# Patient Record
Sex: MALE | Race: WHITE | NOT HISPANIC OR LATINO
[De-identification: names, ages, dates, MRNs, and addresses within clinical notes are randomized per-mention and may not be internally consistent; named-entity substitution may affect disease eponyms.]

---

## 2015-02-25 RX ORDER — LISINOPRIL 2.5 MG/1
1 TABLET ORAL
Qty: 0 | Refills: 0 | COMMUNITY
Start: 2015-02-25

## 2017-02-15 ENCOUNTER — RX RENEWAL (OUTPATIENT)
Age: 57
End: 2017-02-15

## 2017-03-13 ENCOUNTER — MEDICATION RENEWAL (OUTPATIENT)
Age: 57
End: 2017-03-13

## 2017-03-13 ENCOUNTER — RX RENEWAL (OUTPATIENT)
Age: 57
End: 2017-03-13

## 2017-05-01 ENCOUNTER — APPOINTMENT (OUTPATIENT)
Dept: PULMONOLOGY | Facility: CLINIC | Age: 57
End: 2017-05-01

## 2017-05-01 VITALS
SYSTOLIC BLOOD PRESSURE: 120 MMHG | DIASTOLIC BLOOD PRESSURE: 80 MMHG | HEIGHT: 70 IN | WEIGHT: 215 LBS | HEART RATE: 65 BPM | OXYGEN SATURATION: 95 % | RESPIRATION RATE: 17 BRPM | BODY MASS INDEX: 30.78 KG/M2

## 2017-05-10 ENCOUNTER — RX RENEWAL (OUTPATIENT)
Age: 57
End: 2017-05-10

## 2017-05-12 ENCOUNTER — MEDICATION RENEWAL (OUTPATIENT)
Age: 57
End: 2017-05-12

## 2017-05-12 ENCOUNTER — RX RENEWAL (OUTPATIENT)
Age: 57
End: 2017-05-12

## 2017-06-24 ENCOUNTER — RX RENEWAL (OUTPATIENT)
Age: 57
End: 2017-06-24

## 2017-08-18 ENCOUNTER — RX RENEWAL (OUTPATIENT)
Age: 57
End: 2017-08-18

## 2017-10-02 ENCOUNTER — NON-APPOINTMENT (OUTPATIENT)
Age: 57
End: 2017-10-02

## 2017-10-02 ENCOUNTER — APPOINTMENT (OUTPATIENT)
Dept: CARDIOLOGY | Facility: CLINIC | Age: 57
End: 2017-10-02
Payer: COMMERCIAL

## 2017-10-02 VITALS
OXYGEN SATURATION: 90 % | DIASTOLIC BLOOD PRESSURE: 84 MMHG | BODY MASS INDEX: 30.78 KG/M2 | HEIGHT: 70 IN | HEART RATE: 71 BPM | SYSTOLIC BLOOD PRESSURE: 155 MMHG | WEIGHT: 215 LBS

## 2017-10-02 PROCEDURE — 93000 ELECTROCARDIOGRAM COMPLETE: CPT

## 2017-10-02 PROCEDURE — 99215 OFFICE O/P EST HI 40 MIN: CPT

## 2017-10-11 ENCOUNTER — OTHER (OUTPATIENT)
Age: 57
End: 2017-10-11

## 2017-10-11 DIAGNOSIS — R09.89 OTHER SPECIFIED SYMPTOMS AND SIGNS INVOLVING THE CIRCULATORY AND RESPIRATORY SYSTEMS: ICD-10-CM

## 2017-10-24 ENCOUNTER — APPOINTMENT (OUTPATIENT)
Dept: PULMONOLOGY | Facility: CLINIC | Age: 57
End: 2017-10-24

## 2017-11-06 ENCOUNTER — NON-APPOINTMENT (OUTPATIENT)
Age: 57
End: 2017-11-06

## 2017-11-06 ENCOUNTER — APPOINTMENT (OUTPATIENT)
Dept: CARDIOLOGY | Facility: CLINIC | Age: 57
End: 2017-11-06
Payer: COMMERCIAL

## 2017-11-06 VITALS
DIASTOLIC BLOOD PRESSURE: 83 MMHG | SYSTOLIC BLOOD PRESSURE: 160 MMHG | OXYGEN SATURATION: 96 % | HEIGHT: 70 IN | HEART RATE: 62 BPM | BODY MASS INDEX: 30.78 KG/M2 | WEIGHT: 215 LBS

## 2017-11-06 VITALS — DIASTOLIC BLOOD PRESSURE: 80 MMHG | SYSTOLIC BLOOD PRESSURE: 145 MMHG

## 2017-11-06 PROCEDURE — 99215 OFFICE O/P EST HI 40 MIN: CPT

## 2017-11-06 PROCEDURE — 93000 ELECTROCARDIOGRAM COMPLETE: CPT

## 2017-11-21 ENCOUNTER — RX RENEWAL (OUTPATIENT)
Age: 57
End: 2017-11-21

## 2017-11-21 RX ORDER — ARMODAFINIL 250 MG/1
250 TABLET ORAL
Qty: 30 | Refills: 2 | Status: ACTIVE | COMMUNITY
Start: 2017-11-21 | End: 1900-01-01

## 2017-11-22 ENCOUNTER — APPOINTMENT (OUTPATIENT)
Dept: CARDIOLOGY | Facility: CLINIC | Age: 57
End: 2017-11-22
Payer: COMMERCIAL

## 2017-11-22 PROCEDURE — 93015 CV STRESS TEST SUPVJ I&R: CPT

## 2017-11-22 PROCEDURE — 78452 HT MUSCLE IMAGE SPECT MULT: CPT

## 2017-11-22 PROCEDURE — A9500: CPT

## 2017-12-11 ENCOUNTER — APPOINTMENT (OUTPATIENT)
Dept: CARDIOLOGY | Facility: CLINIC | Age: 57
End: 2017-12-11

## 2018-01-25 ENCOUNTER — APPOINTMENT (OUTPATIENT)
Dept: PULMONOLOGY | Facility: CLINIC | Age: 58
End: 2018-01-25
Payer: COMMERCIAL

## 2018-01-25 VITALS
OXYGEN SATURATION: 96 % | HEART RATE: 76 BPM | SYSTOLIC BLOOD PRESSURE: 120 MMHG | BODY MASS INDEX: 30.21 KG/M2 | DIASTOLIC BLOOD PRESSURE: 70 MMHG | HEIGHT: 70 IN | WEIGHT: 211 LBS | RESPIRATION RATE: 14 BRPM

## 2018-01-25 PROCEDURE — 99214 OFFICE O/P EST MOD 30 MIN: CPT | Mod: 25

## 2018-01-25 PROCEDURE — 94010 BREATHING CAPACITY TEST: CPT

## 2018-01-25 PROCEDURE — 99406 BEHAV CHNG SMOKING 3-10 MIN: CPT

## 2018-01-25 RX ORDER — UMECLIDINIUM BROMIDE AND VILANTEROL TRIFENATATE 62.5; 25 UG/1; UG/1
62.5-25 POWDER RESPIRATORY (INHALATION) DAILY
Qty: 3 | Refills: 1 | Status: ACTIVE | COMMUNITY
Start: 2018-01-25 | End: 1900-01-01

## 2018-01-31 ENCOUNTER — RX RENEWAL (OUTPATIENT)
Age: 58
End: 2018-01-31

## 2018-02-16 ENCOUNTER — RX RENEWAL (OUTPATIENT)
Age: 58
End: 2018-02-16

## 2018-02-19 ENCOUNTER — RX RENEWAL (OUTPATIENT)
Age: 58
End: 2018-02-19

## 2018-02-26 ENCOUNTER — RX RENEWAL (OUTPATIENT)
Age: 58
End: 2018-02-26

## 2018-06-15 ENCOUNTER — MEDICATION RENEWAL (OUTPATIENT)
Age: 58
End: 2018-06-15

## 2018-07-06 ENCOUNTER — RX RENEWAL (OUTPATIENT)
Age: 58
End: 2018-07-06

## 2018-07-12 ENCOUNTER — FORM ENCOUNTER (OUTPATIENT)
Age: 58
End: 2018-07-12

## 2018-07-13 ENCOUNTER — OUTPATIENT (OUTPATIENT)
Dept: OUTPATIENT SERVICES | Facility: HOSPITAL | Age: 58
LOS: 1 days | End: 2018-07-13
Payer: COMMERCIAL

## 2018-07-13 ENCOUNTER — APPOINTMENT (OUTPATIENT)
Dept: CT IMAGING | Facility: CLINIC | Age: 58
End: 2018-07-13
Payer: COMMERCIAL

## 2018-07-13 DIAGNOSIS — Z00.8 ENCOUNTER FOR OTHER GENERAL EXAMINATION: ICD-10-CM

## 2018-07-13 DIAGNOSIS — Z95.5 PRESENCE OF CORONARY ANGIOPLASTY IMPLANT AND GRAFT: Chronic | ICD-10-CM

## 2018-07-13 PROCEDURE — 71250 CT THORAX DX C-: CPT

## 2018-07-13 PROCEDURE — 71250 CT THORAX DX C-: CPT | Mod: 26

## 2018-07-17 ENCOUNTER — APPOINTMENT (OUTPATIENT)
Dept: CARDIOLOGY | Facility: CLINIC | Age: 58
End: 2018-07-17
Payer: COMMERCIAL

## 2018-07-17 ENCOUNTER — NON-APPOINTMENT (OUTPATIENT)
Age: 58
End: 2018-07-17

## 2018-07-17 VITALS
HEART RATE: 67 BPM | SYSTOLIC BLOOD PRESSURE: 139 MMHG | HEIGHT: 70 IN | BODY MASS INDEX: 31.07 KG/M2 | WEIGHT: 217 LBS | DIASTOLIC BLOOD PRESSURE: 77 MMHG | OXYGEN SATURATION: 95 %

## 2018-07-17 PROCEDURE — 99215 OFFICE O/P EST HI 40 MIN: CPT

## 2018-07-17 PROCEDURE — 93000 ELECTROCARDIOGRAM COMPLETE: CPT

## 2018-07-20 ENCOUNTER — OTHER (OUTPATIENT)
Age: 58
End: 2018-07-20

## 2018-07-20 DIAGNOSIS — G45.9 TRANSIENT CEREBRAL ISCHEMIC ATTACK, UNSPECIFIED: ICD-10-CM

## 2018-07-21 ENCOUNTER — APPOINTMENT (OUTPATIENT)
Dept: CARDIOLOGY | Facility: CLINIC | Age: 58
End: 2018-07-21

## 2018-07-24 ENCOUNTER — MEDICATION RENEWAL (OUTPATIENT)
Age: 58
End: 2018-07-24

## 2018-07-25 ENCOUNTER — APPOINTMENT (OUTPATIENT)
Dept: PULMONOLOGY | Facility: CLINIC | Age: 58
End: 2018-07-25
Payer: COMMERCIAL

## 2018-07-25 ENCOUNTER — NON-APPOINTMENT (OUTPATIENT)
Age: 58
End: 2018-07-25

## 2018-07-25 VITALS
SYSTOLIC BLOOD PRESSURE: 130 MMHG | HEIGHT: 70 IN | RESPIRATION RATE: 16 BRPM | WEIGHT: 217 LBS | DIASTOLIC BLOOD PRESSURE: 70 MMHG | HEART RATE: 61 BPM | BODY MASS INDEX: 31.07 KG/M2 | OXYGEN SATURATION: 99 %

## 2018-07-25 PROCEDURE — 94618 PULMONARY STRESS TESTING: CPT

## 2018-07-25 PROCEDURE — 94010 BREATHING CAPACITY TEST: CPT | Mod: 59

## 2018-07-25 PROCEDURE — 99214 OFFICE O/P EST MOD 30 MIN: CPT | Mod: 25

## 2018-07-25 RX ORDER — ARMODAFINIL 250 MG/1
250 TABLET ORAL
Qty: 30 | Refills: 5 | Status: ACTIVE | COMMUNITY
Start: 2018-07-25 | End: 1900-01-01

## 2018-08-01 ENCOUNTER — RX RENEWAL (OUTPATIENT)
Age: 58
End: 2018-08-01

## 2018-08-06 ENCOUNTER — APPOINTMENT (OUTPATIENT)
Dept: CARDIOLOGY | Facility: CLINIC | Age: 58
End: 2018-08-06
Payer: COMMERCIAL

## 2018-08-06 ENCOUNTER — RX RENEWAL (OUTPATIENT)
Age: 58
End: 2018-08-06

## 2018-08-06 PROCEDURE — 93306 TTE W/DOPPLER COMPLETE: CPT

## 2018-11-12 ENCOUNTER — NON-APPOINTMENT (OUTPATIENT)
Age: 58
End: 2018-11-12

## 2018-11-12 ENCOUNTER — APPOINTMENT (OUTPATIENT)
Dept: CARDIOLOGY | Facility: CLINIC | Age: 58
End: 2018-11-12
Payer: COMMERCIAL

## 2018-11-12 VITALS
WEIGHT: 220 LBS | DIASTOLIC BLOOD PRESSURE: 87 MMHG | OXYGEN SATURATION: 95 % | HEIGHT: 70 IN | SYSTOLIC BLOOD PRESSURE: 144 MMHG | BODY MASS INDEX: 31.5 KG/M2 | HEART RATE: 60 BPM

## 2018-11-12 PROCEDURE — 93000 ELECTROCARDIOGRAM COMPLETE: CPT

## 2018-11-12 PROCEDURE — 99215 OFFICE O/P EST HI 40 MIN: CPT

## 2018-11-12 NOTE — HISTORY OF PRESENT ILLNESS
[FreeTextEntry1] : Anibal Vega presented to the office today for a cardiovascular evaluation. He was last seen in the office in July.\par \par He is now 58 years old, with a history of coronary artery disease. In March of 2008, he developed unstable angina. He was transferred to [facility], where he was found to have a 90% stenosis in his proximal RCA, which was treated with a bare-metal stent. Nonobstructive disease was identified within his LAD at that time.  He had another stress test performed February 23, 2015. After concluding the test, he developed ST segment elevations, and was transferred emergently to [facility]. He was found to have an occluded circumflex with insignificant in-stent restenosis within the RCA stent. Moderate disease was found more distally in the RCA, as well as in the LAD.  Primary angioplasty was performed.  A bare-metal stent was implanted because of a history of bright red blood per rectum.  He has a history of smoking. He has a history of obstructive sleep apnea.  He has been diagnosed with COPD, and he has been taking inhalers intermittently.\par \par At the time of the last visit, he was feeling overall well. \par \par He reports that over the last month, he has had symptoms of discomfort in his mid chest, or by activity and relieved by rest. It does not seem to radiate. He has had these symptoms provoked by relatively modest levels of activity, in warm weather. It does not occur only with extreme levels of activity. About 2 weeks ago, he had mild symptoms at rest, which went away fairly briskly.\par \par He continues to smoke in small amounts.

## 2018-11-12 NOTE — REASON FOR VISIT
[Follow-Up - Clinic] : a clinic follow-up of [Coronary Artery Disease] : coronary artery disease [Dyspnea] : dyspnea

## 2018-11-12 NOTE — PHYSICAL EXAM
[General Appearance - Well Developed] : well developed [Normal Appearance] : normal appearance [Well Groomed] : well groomed [General Appearance - Well Nourished] : well nourished [No Deformities] : no deformities [General Appearance - In No Acute Distress] : no acute distress [Normal Conjunctiva] : the conjunctiva exhibited no abnormalities [Eyelids - No Xanthelasma] : the eyelids demonstrated no xanthelasmas [Normal Oral Mucosa] : normal oral mucosa [No Oral Pallor] : no oral pallor [No Oral Cyanosis] : no oral cyanosis [Normal Jugular Venous A Waves Present] : normal jugular venous A waves present [Normal Jugular Venous V Waves Present] : normal jugular venous V waves present [No Jugular Venous Youngblood A Waves] : no jugular venous youngblood A waves [Respiration, Rhythm And Depth] : normal respiratory rhythm and effort [Exaggerated Use Of Accessory Muscles For Inspiration] : no accessory muscle use [Auscultation Breath Sounds / Voice Sounds] : lungs were clear to auscultation bilaterally [Abdomen Soft] : soft [Abdomen Tenderness] : non-tender [Abdomen Mass (___ Cm)] : no abdominal mass palpated [Abnormal Walk] : normal gait [Gait - Sufficient For Exercise Testing] : the gait was sufficient for exercise testing [Nail Clubbing] : no clubbing of the fingernails [Cyanosis, Localized] : no localized cyanosis [Petechial Hemorrhages (___cm)] : no petechial hemorrhages [Skin Color & Pigmentation] : normal skin color and pigmentation [] : no rash [No Venous Stasis] : no venous stasis [Skin Lesions] : no skin lesions [No Skin Ulcers] : no skin ulcer [No Xanthoma] : no  xanthoma was observed [Oriented To Time, Place, And Person] : oriented to person, place, and time [Affect] : the affect was normal [Mood] : the mood was normal [No Anxiety] : not feeling anxious [Normal Rate] : normal [Rhythm Regular] : regular [Normal S1] : normal S1 [Normal S2] : normal S2 [No Gallop] : no gallop heard [S3] : no S3 [S4] : no S4 [Right Carotid Bruit] : no bruit heard over the right carotid [Left Carotid Bruit] : left carotid bruit heard [Right Femoral Bruit] : no bruit heard over the right femoral artery [Left Femoral Bruit] : no bruit heard over the left femoral artery [2+] : left 2+ [Bruit] : no bruit heard [No Pitting Edema] : no pitting edema present

## 2018-11-12 NOTE — DISCUSSION/SUMMARY
[FreeTextEntry1] : Mr. Vega has a history of coronary artery disease, dating back to 2008. He has a history of smoking with associated COPD. He presented in 2015 with exertional dyspnea.  He developed a myocardial infarction following the conclusion of exercise on the treadmill.\par \par He presents with symptoms consistent with unstable angina, at a relatively low".\par \par We discussed his various options. Given his history and clinical course, I think that cardiac catheterization is the best solution. He will have this done within the next several days, and return to the office in 2 weeks for another evaluation.

## 2018-11-15 ENCOUNTER — INPATIENT (INPATIENT)
Facility: HOSPITAL | Age: 58
LOS: 0 days | Discharge: HOME | DRG: 247 | End: 2018-11-16
Attending: INTERNAL MEDICINE | Admitting: INTERNAL MEDICINE
Payer: COMMERCIAL

## 2018-11-15 ENCOUNTER — TRANSCRIPTION ENCOUNTER (OUTPATIENT)
Age: 58
End: 2018-11-15

## 2018-11-15 VITALS
HEIGHT: 70 IN | SYSTOLIC BLOOD PRESSURE: 150 MMHG | OXYGEN SATURATION: 97 % | RESPIRATION RATE: 16 BRPM | DIASTOLIC BLOOD PRESSURE: 72 MMHG | HEART RATE: 62 BPM | TEMPERATURE: 98 F | WEIGHT: 220.02 LBS

## 2018-11-15 DIAGNOSIS — Z95.5 PRESENCE OF CORONARY ANGIOPLASTY IMPLANT AND GRAFT: Chronic | ICD-10-CM

## 2018-11-15 DIAGNOSIS — I25.10 ATHEROSCLEROTIC HEART DISEASE OF NATIVE CORONARY ARTERY WITHOUT ANGINA PECTORIS: ICD-10-CM

## 2018-11-15 LAB
ALBUMIN SERPL ELPH-MCNC: 4.4 G/DL — SIGNIFICANT CHANGE UP (ref 3.3–5)
ALP SERPL-CCNC: 92 U/L — SIGNIFICANT CHANGE UP (ref 40–120)
ALT FLD-CCNC: 28 U/L — SIGNIFICANT CHANGE UP (ref 10–45)
ANION GAP SERPL CALC-SCNC: 12 MMOL/L — SIGNIFICANT CHANGE UP (ref 5–17)
AST SERPL-CCNC: 21 U/L — SIGNIFICANT CHANGE UP (ref 10–40)
BILIRUB SERPL-MCNC: 0.4 MG/DL — SIGNIFICANT CHANGE UP (ref 0.2–1.2)
BUN SERPL-MCNC: 15 MG/DL — SIGNIFICANT CHANGE UP (ref 7–23)
CALCIUM SERPL-MCNC: 9.8 MG/DL — SIGNIFICANT CHANGE UP (ref 8.4–10.5)
CHLORIDE SERPL-SCNC: 102 MMOL/L — SIGNIFICANT CHANGE UP (ref 96–108)
CO2 SERPL-SCNC: 26 MMOL/L — SIGNIFICANT CHANGE UP (ref 22–31)
CREAT SERPL-MCNC: 1.37 MG/DL — HIGH (ref 0.5–1.3)
GLUCOSE SERPL-MCNC: 82 MG/DL — SIGNIFICANT CHANGE UP (ref 70–99)
HCT VFR BLD CALC: 48.6 % — SIGNIFICANT CHANGE UP (ref 39–50)
HGB BLD-MCNC: 16.4 G/DL — SIGNIFICANT CHANGE UP (ref 13–17)
MCHC RBC-ENTMCNC: 31.3 PG — SIGNIFICANT CHANGE UP (ref 27–34)
MCHC RBC-ENTMCNC: 33.8 GM/DL — SIGNIFICANT CHANGE UP (ref 32–36)
MCV RBC AUTO: 92.4 FL — SIGNIFICANT CHANGE UP (ref 80–100)
PLATELET # BLD AUTO: 251 K/UL — SIGNIFICANT CHANGE UP (ref 150–400)
POTASSIUM SERPL-MCNC: 4.6 MMOL/L — SIGNIFICANT CHANGE UP (ref 3.5–5.3)
POTASSIUM SERPL-SCNC: 4.6 MMOL/L — SIGNIFICANT CHANGE UP (ref 3.5–5.3)
PROT SERPL-MCNC: 7.4 G/DL — SIGNIFICANT CHANGE UP (ref 6–8.3)
RBC # BLD: 5.25 M/UL — SIGNIFICANT CHANGE UP (ref 4.2–5.8)
RBC # FLD: 12.9 % — SIGNIFICANT CHANGE UP (ref 10.3–14.5)
SODIUM SERPL-SCNC: 140 MMOL/L — SIGNIFICANT CHANGE UP (ref 135–145)
WBC # BLD: 12.9 K/UL — HIGH (ref 3.8–10.5)
WBC # FLD AUTO: 12.9 K/UL — HIGH (ref 3.8–10.5)

## 2018-11-15 PROCEDURE — 99204 OFFICE O/P NEW MOD 45 MIN: CPT

## 2018-11-15 PROCEDURE — 99152 MOD SED SAME PHYS/QHP 5/>YRS: CPT | Mod: GC

## 2018-11-15 PROCEDURE — 93010 ELECTROCARDIOGRAM REPORT: CPT

## 2018-11-15 PROCEDURE — 93010 ELECTROCARDIOGRAM REPORT: CPT | Mod: 77,76

## 2018-11-15 PROCEDURE — 92928 PRQ TCAT PLMT NTRAC ST 1 LES: CPT | Mod: RC,GC

## 2018-11-15 PROCEDURE — 93458 L HRT ARTERY/VENTRICLE ANGIO: CPT | Mod: 26,59,GC

## 2018-11-15 RX ORDER — LISINOPRIL 2.5 MG/1
10 TABLET ORAL DAILY
Qty: 0 | Refills: 0 | Status: DISCONTINUED | OUTPATIENT
Start: 2018-11-15 | End: 2018-11-16

## 2018-11-15 RX ORDER — CHOLECALCIFEROL (VITAMIN D3) 125 MCG
1 CAPSULE ORAL
Qty: 0 | Refills: 0 | COMMUNITY

## 2018-11-15 RX ORDER — SODIUM CHLORIDE 9 MG/ML
250 INJECTION INTRAMUSCULAR; INTRAVENOUS; SUBCUTANEOUS ONCE
Qty: 0 | Refills: 0 | Status: DISCONTINUED | OUTPATIENT
Start: 2018-11-15 | End: 2018-11-16

## 2018-11-15 RX ORDER — CHOLECALCIFEROL (VITAMIN D3) 125 MCG
5000 CAPSULE ORAL DAILY
Qty: 0 | Refills: 0 | Status: DISCONTINUED | OUTPATIENT
Start: 2018-11-15 | End: 2018-11-16

## 2018-11-15 RX ORDER — BUPROPION HYDROCHLORIDE 150 MG/1
1 TABLET, EXTENDED RELEASE ORAL
Qty: 0 | Refills: 0 | COMMUNITY

## 2018-11-15 RX ORDER — METOPROLOL TARTRATE 50 MG
25 TABLET ORAL EVERY 12 HOURS
Qty: 0 | Refills: 0 | Status: DISCONTINUED | OUTPATIENT
Start: 2018-11-15 | End: 2018-11-16

## 2018-11-15 RX ORDER — MULTIVIT-MIN/FERROUS GLUCONATE 9 MG/15 ML
1 LIQUID (ML) ORAL
Qty: 0 | Refills: 0 | COMMUNITY

## 2018-11-15 RX ORDER — MONTELUKAST 4 MG/1
1 TABLET, CHEWABLE ORAL
Qty: 0 | Refills: 0 | COMMUNITY

## 2018-11-15 RX ORDER — IPRATROPIUM/ALBUTEROL SULFATE 18-103MCG
3 AEROSOL WITH ADAPTER (GRAM) INHALATION EVERY 6 HOURS
Qty: 0 | Refills: 0 | Status: DISCONTINUED | OUTPATIENT
Start: 2018-11-15 | End: 2018-11-15

## 2018-11-15 RX ORDER — BUPROPION HYDROCHLORIDE 150 MG/1
150 TABLET, EXTENDED RELEASE ORAL DAILY
Qty: 0 | Refills: 0 | Status: DISCONTINUED | OUTPATIENT
Start: 2018-11-15 | End: 2018-11-16

## 2018-11-15 RX ORDER — MONTELUKAST 4 MG/1
10 TABLET, CHEWABLE ORAL DAILY
Qty: 0 | Refills: 0 | Status: DISCONTINUED | OUTPATIENT
Start: 2018-11-15 | End: 2018-11-16

## 2018-11-15 RX ORDER — CLOPIDOGREL BISULFATE 75 MG/1
75 TABLET, FILM COATED ORAL DAILY
Qty: 0 | Refills: 0 | Status: DISCONTINUED | OUTPATIENT
Start: 2018-11-16 | End: 2018-11-16

## 2018-11-15 RX ORDER — ASPIRIN/CALCIUM CARB/MAGNESIUM 324 MG
81 TABLET ORAL DAILY
Qty: 0 | Refills: 0 | Status: DISCONTINUED | OUTPATIENT
Start: 2018-11-16 | End: 2018-11-16

## 2018-11-15 RX ORDER — ATORVASTATIN CALCIUM 80 MG/1
80 TABLET, FILM COATED ORAL AT BEDTIME
Qty: 0 | Refills: 0 | Status: DISCONTINUED | OUTPATIENT
Start: 2018-11-15 | End: 2018-11-16

## 2018-11-15 RX ORDER — SODIUM CHLORIDE 9 MG/ML
1000 INJECTION INTRAMUSCULAR; INTRAVENOUS; SUBCUTANEOUS
Qty: 0 | Refills: 0 | Status: DISCONTINUED | OUTPATIENT
Start: 2018-11-15 | End: 2018-11-16

## 2018-11-15 RX ORDER — ASPIRIN/CALCIUM CARB/MAGNESIUM 324 MG
1 TABLET ORAL
Qty: 0 | Refills: 0 | COMMUNITY

## 2018-11-15 RX ORDER — MULTIVIT-MIN/FERROUS GLUCONATE 9 MG/15 ML
1 LIQUID (ML) ORAL DAILY
Qty: 0 | Refills: 0 | Status: DISCONTINUED | OUTPATIENT
Start: 2018-11-15 | End: 2018-11-16

## 2018-11-15 RX ADMIN — BUPROPION HYDROCHLORIDE 150 MILLIGRAM(S): 150 TABLET, EXTENDED RELEASE ORAL at 17:34

## 2018-11-15 RX ADMIN — SODIUM CHLORIDE 275 MILLILITER(S): 9 INJECTION INTRAMUSCULAR; INTRAVENOUS; SUBCUTANEOUS at 17:32

## 2018-11-15 RX ADMIN — Medication 25 MILLIGRAM(S): at 17:34

## 2018-11-15 RX ADMIN — Medication 1 TABLET(S): at 17:32

## 2018-11-15 NOTE — DISCHARGE NOTE ADULT - CARE PROVIDER_API CALL
John Wallis), Cardiovascular Disease; Interventional Cardiology  [address] [place] [id number]  Phone: [phone]  Fax: [phone]

## 2018-11-15 NOTE — DISCHARGE NOTE ADULT - PLAN OF CARE
Low salt, low fat diet.   Weight management.   Take medications as prescribed.    No smoking.  Follow up appointments with your doctor(s)  as instruced. No heavy lifting for 2 weeks, no strenuous activity  ( pushing/ pulling) no driving for x 2 days,  you may shower 24 hours following procedure but no bathing or swimming for x1  week, no strenuous sex for x 1 week & follow up with your cardiologist in 1-2 week Your LDL cholesterol will be less than 70mg/dL Continue with your cholesterol medications. Eat a heart healthy diet that is low in saturated fats and salt, and includes whole grains, fruits, vegetables and lean protein; exercise regularly (consult with your physician or cardiologist first); maintain a heart healthy weight; if you smoke - quit (A resource to help you stop smoking is the [facility] – phone number [phone].). Continue to follow with your primary physician or cardiologist. Your blood pressure will be controlled. Continue with your blood pressure medications; eat a heart healthy diet with low salt diet; exercise regularly (consult with your physician or cardiologist first); maintain a heart healthy weight; if you smoke - quit (A resource to help you stop smoking is the [facility] – phone number [phone].); include healthy ways to manage stress. Continue to follow with your primary care physician or cardiologist.

## 2018-11-15 NOTE — H&P CARDIOLOGY - PMH
COPD (chronic obstructive pulmonary disease)    Coronary artery disease  2 stents  Hypercholesterolemia    Hypertension    Sleep apnea, obstructive

## 2018-11-15 NOTE — H&P CARDIOLOGY - HISTORY OF PRESENT ILLNESS
55 yo M with hx of HTN, HLD, NAN on CPAP, COPD, and CAD with bare metal stent placement in RCA in 2008, presented today for coronary angiogram. Patient states he has exertional chest pain, mid sternal, non radiating, for last weeks. Seen and evaluated by cardiologist Dr. Conley and recommends for cardiac cath. Denies SOB, palpitation dizziness/ syncope. 55 yo M with hx of HTN, HLD, NAN on CPAP, COPD, and CAD with bare metal stent placement in RCA in 2008, presented today for coronary angiogram. Patient states he has exertional chest pain,mid sternal, non radiating, for last two weeks. Seen and evaluated by cardiologist Dr. Conley and recommends for cardiac cath. Denies SOB, palpitation dizziness/ syncope.

## 2018-11-15 NOTE — DISCHARGE NOTE ADULT - MEDICATION SUMMARY - MEDICATIONS TO TAKE
I will START or STAY ON the medications listed below when I get home from the hospital:    Ecotrin Adult Low Strength 81 mg oral delayed release tablet  -- 1 tab(s) by mouth once a day  -- Indication: For cad    lisinopril 10 mg oral tablet  -- 1 tab(s) by mouth once a day  -- Indication: For htn    atorvastatin 80 mg oral tablet  -- 1 tab(s) by mouth once a day (at bedtime)  -- Indication: For cholesterol     clopidogrel 75 mg oral tablet  -- 1 tab(s) by mouth once a day  -- Indication: For to keep stent patent    metoprolol tartrate 25 mg oral tablet  -- 1 tab(s) by mouth every 12 hours  -- Indication: For beta blocker hr and b/p     Anoro Ellipta 62.5 mcg-25 mcg inhalation powder  -- 1 puff(s) inhaled once a day  -- Indication: For Asthma    Nuvigil 250 mg oral tablet  -- 1 tab(s) by mouth once a day  -- Indication: For narcolepsy    montelukast 10 mg oral tablet  -- 1 tab(s) by mouth once a day  -- Indication: For Asthma    buPROPion 150 mg/24 hours (XL) oral tablet, extended release  -- 1 tab(s) by mouth every 24 hours  -- Indication: For Antidepressant     Centrum Silver oral tablet  -- 1 tab(s) by mouth once a day  -- Indication: For supplement    Vitamin D3 5000 intl units oral capsule  -- 1 cap(s) by mouth once a day  -- Indication: For supplement

## 2018-11-15 NOTE — H&P CARDIOLOGY - FAMILY HISTORY
Mother  Still living? Unknown  Family history of coronary arteriosclerosis, Age at diagnosis: Age Unknown

## 2018-11-15 NOTE — DISCHARGE NOTE ADULT - CARE PLAN
Principal Discharge DX:	Coronary artery disease  Goal:	Low salt, low fat diet.   Weight management.   Take medications as prescribed.    No smoking.  Follow up appointments with your doctor(s)  as instruced.  Assessment and plan of treatment:	No heavy lifting for 2 weeks, no strenuous activity  ( pushing/ pulling) no driving for x 2 days,  you may shower 24 hours following procedure but no bathing or swimming for x1  week, no strenuous sex for x 1 week & follow up with your cardiologist in 1-2 week  Secondary Diagnosis:	Hypercholesterolemia  Goal:	Your LDL cholesterol will be less than 70mg/dL  Assessment and plan of treatment:	Continue with your cholesterol medications. Eat a heart healthy diet that is low in saturated fats and salt, and includes whole grains, fruits, vegetables and lean protein; exercise regularly (consult with your physician or cardiologist first); maintain a heart healthy weight; if you smoke - quit (A resource to help you stop smoking is the [facility] Tobacco Control – phone number [phone].). Continue to follow with your primary physician or cardiologist.  Secondary Diagnosis:	Hypertension  Goal:	Your blood pressure will be controlled.  Assessment and plan of treatment:	Continue with your blood pressure medications; eat a heart healthy diet with low salt diet; exercise regularly (consult with your physician or cardiologist first); maintain a heart healthy weight; if you smoke - quit (A resource to help you stop smoking is the [facility] for [employer] Control – phone number [phone].); include healthy ways to manage stress. Continue to follow with your primary care physician or cardiologist.

## 2018-11-15 NOTE — CHART NOTE - NSCHARTNOTEFT_GEN_A_CORE
Patient underwent a PCI procedure and is being admitted as they are at increased risk for major adverse cardiac and vascular events if discharged due to the following high risk characteristics:      Pre- Procedural Clinical Criteria  Unstable angina     Admit- patient underwent a PCI procedure and is being admitted due to high risk characteristicts and is considered to be at an increased risk of major adverse cardiovascular events if discharged at this time   CHRISTIE to Mid RCA via RRB

## 2018-11-15 NOTE — DISCHARGE NOTE ADULT - HOSPITAL COURSE
53 yo M with hx of HTN, HLD, NAN on CPAP, COPD, and CAD with bare metal stent placement in RCA in 2008, presented today for coronary angiogram. Patient states he has exertional chest pain,mid sternal, non radiating, for last two weeks. Seen and evaluated by cardiologist Dr. Conley and recommends for cardiac cath. Denies SOB, palpitation dizziness/ syncope.

## 2018-11-15 NOTE — DISCHARGE NOTE ADULT - PATIENT PORTAL LINK FT
You can access the [facility] Patient Portal, offered by [facility], by registering with the following website: [email or web address]

## 2018-11-16 VITALS
DIASTOLIC BLOOD PRESSURE: 85 MMHG | RESPIRATION RATE: 16 BRPM | TEMPERATURE: 98 F | OXYGEN SATURATION: 93 % | SYSTOLIC BLOOD PRESSURE: 145 MMHG | HEART RATE: 66 BPM

## 2018-11-16 LAB
ANION GAP SERPL CALC-SCNC: 14 MMOL/L — SIGNIFICANT CHANGE UP (ref 5–17)
BUN SERPL-MCNC: 18 MG/DL — SIGNIFICANT CHANGE UP (ref 7–23)
CALCIUM SERPL-MCNC: 8.9 MG/DL — SIGNIFICANT CHANGE UP (ref 8.4–10.5)
CHLORIDE SERPL-SCNC: 106 MMOL/L — SIGNIFICANT CHANGE UP (ref 96–108)
CO2 SERPL-SCNC: 21 MMOL/L — LOW (ref 22–31)
CREAT SERPL-MCNC: 1.26 MG/DL — SIGNIFICANT CHANGE UP (ref 0.5–1.3)
GLUCOSE SERPL-MCNC: 97 MG/DL — SIGNIFICANT CHANGE UP (ref 70–99)
HCT VFR BLD CALC: 45.8 % — SIGNIFICANT CHANGE UP (ref 39–50)
HGB BLD-MCNC: 15.7 G/DL — SIGNIFICANT CHANGE UP (ref 13–17)
MCHC RBC-ENTMCNC: 31.4 PG — SIGNIFICANT CHANGE UP (ref 27–34)
MCHC RBC-ENTMCNC: 34.3 GM/DL — SIGNIFICANT CHANGE UP (ref 32–36)
MCV RBC AUTO: 91.8 FL — SIGNIFICANT CHANGE UP (ref 80–100)
PLATELET # BLD AUTO: 210 K/UL — SIGNIFICANT CHANGE UP (ref 150–400)
POTASSIUM SERPL-MCNC: 4.1 MMOL/L — SIGNIFICANT CHANGE UP (ref 3.5–5.3)
POTASSIUM SERPL-SCNC: 4.1 MMOL/L — SIGNIFICANT CHANGE UP (ref 3.5–5.3)
RBC # BLD: 4.99 M/UL — SIGNIFICANT CHANGE UP (ref 4.2–5.8)
RBC # FLD: 12.7 % — SIGNIFICANT CHANGE UP (ref 10.3–14.5)
SODIUM SERPL-SCNC: 141 MMOL/L — SIGNIFICANT CHANGE UP (ref 135–145)
WBC # BLD: 11.3 K/UL — HIGH (ref 3.8–10.5)
WBC # FLD AUTO: 11.3 K/UL — HIGH (ref 3.8–10.5)

## 2018-11-16 PROCEDURE — C9600: CPT | Mod: RC

## 2018-11-16 PROCEDURE — C1894: CPT

## 2018-11-16 PROCEDURE — C1874: CPT

## 2018-11-16 PROCEDURE — 93458 L HRT ARTERY/VENTRICLE ANGIO: CPT | Mod: 59

## 2018-11-16 PROCEDURE — 80048 BASIC METABOLIC PNL TOTAL CA: CPT

## 2018-11-16 PROCEDURE — 80053 COMPREHEN METABOLIC PANEL: CPT

## 2018-11-16 PROCEDURE — 99152 MOD SED SAME PHYS/QHP 5/>YRS: CPT

## 2018-11-16 PROCEDURE — C1725: CPT

## 2018-11-16 PROCEDURE — 85027 COMPLETE CBC AUTOMATED: CPT

## 2018-11-16 PROCEDURE — 93005 ELECTROCARDIOGRAM TRACING: CPT

## 2018-11-16 PROCEDURE — C1769: CPT

## 2018-11-16 PROCEDURE — C1887: CPT

## 2018-11-16 RX ADMIN — CLOPIDOGREL BISULFATE 75 MILLIGRAM(S): 75 TABLET, FILM COATED ORAL at 05:30

## 2018-11-16 RX ADMIN — Medication 25 MILLIGRAM(S): at 05:30

## 2018-11-16 RX ADMIN — Medication 81 MILLIGRAM(S): at 05:30

## 2018-11-16 RX ADMIN — ATORVASTATIN CALCIUM 80 MILLIGRAM(S): 80 TABLET, FILM COATED ORAL at 05:30

## 2018-11-16 RX ADMIN — LISINOPRIL 10 MILLIGRAM(S): 2.5 TABLET ORAL at 05:30

## 2018-11-16 NOTE — PROGRESS NOTE ADULT - SUBJECTIVE AND OBJECTIVE BOX
58y old  Male who presents with a chief complaint of chest pain (15 Nov 2018 18:34) now s/p cadiac cath CHRISTIE x 1 RCA via right radial artery access.           Allergies    No Known Allergies    Intolerances        Medications:  aspirin enteric coated 81 milliGRAM(s) Oral daily  atorvastatin 80 milliGRAM(s) Oral at bedtime  buPROPion XL . 150 milliGRAM(s) Oral daily  cholecalciferol 5000 Unit(s) Oral daily  clopidogrel Tablet 75 milliGRAM(s) Oral daily  lisinopril 10 milliGRAM(s) Oral daily  metoprolol tartrate 25 milliGRAM(s) Oral every 12 hours  montelukast 10 milliGRAM(s) Oral daily  multivitamin/minerals 1 Tablet(s) Oral daily  sodium chloride 0.9% Bolus 250 milliLiter(s) IV Bolus once  sodium chloride 0.9%. 1000 milliLiter(s) IV Continuous <Continuous>      Vitals:  T(C): 36.6 (11-15-18 @ 21:11), Max: 36.6 (11-15-18 @ 09:55)  HR: 63 (11-15-18 @ 21:11) (59 - 68)  BP: 140/78 (11-15-18 @ 21:11) (125/85 - 150/72)  BP(mean): 98 (11-15-18 @ 09:55) (98 - 98)  RR: 16 (11-15-18 @ 21:11) (16 - 17)  SpO2: 94% (11-15-18 @ 21:11) (92% - 98%)  Wt(kg): --  Daily Height in cm: 180.34 (15 Nov 2018 13:05)    Daily Weight in k.8 (15 Nov 2018 09:55)  I&O's Summary    15 Nov 2018 07:01  -  2018 00:40  --------------------------------------------------------  IN: 1270 mL / OUT: 0 mL / NET: 1270 mL          Physical Exam:  Cardiovascular: S1S2  Procedural Access Site: Right radial artery access. No hematoma, Non-tender to palpation, 2+ pulse, No bruit, No Ecchymosis  Respiratory: Clear to auscultation bilaterally  Neurologic: Non-focal  Psychiatry: AAOx3, Mood & affect appropriate      11-15    140  |  102  |  15  ----------------------------<  82  4.6   |  26  |  1.37<H>    Ca    9.8      15 Nov 2018 10:11    TPro  7.4  /  Alb  4.4  /  TBili  0.4  /  DBili  x   /  AST  21  /  ALT  28  /  AlkPhos  92  11-15      Interpretation of Telemetry:      ASSESSMENT/PLAN  58y old  Male who presents with a chief complaint of chest pain (15 Nov 2018 18:34) now s/p cadiac cath CHRISTIE x 1 RCA via right radial artery access. Pt tolerated the procedure well, cardiac cath site benign. Overnight remained uneventful. Pt denies CP, palpitations or SOB. Lab work reviewed. Post-procedure discharge instructions discussed and questions address

## 2018-11-26 ENCOUNTER — APPOINTMENT (OUTPATIENT)
Dept: CARDIOLOGY | Facility: CLINIC | Age: 58
End: 2018-11-26
Payer: COMMERCIAL

## 2018-11-26 ENCOUNTER — NON-APPOINTMENT (OUTPATIENT)
Age: 58
End: 2018-11-26

## 2018-11-26 VITALS
WEIGHT: 218 LBS | HEIGHT: 70 IN | BODY MASS INDEX: 31.21 KG/M2 | OXYGEN SATURATION: 96 % | DIASTOLIC BLOOD PRESSURE: 76 MMHG | HEART RATE: 63 BPM | SYSTOLIC BLOOD PRESSURE: 150 MMHG

## 2018-11-26 DIAGNOSIS — I49.3 VENTRICULAR PREMATURE DEPOLARIZATION: ICD-10-CM

## 2018-11-26 PROCEDURE — 93000 ELECTROCARDIOGRAM COMPLETE: CPT

## 2018-11-26 PROCEDURE — 99214 OFFICE O/P EST MOD 30 MIN: CPT

## 2018-11-26 NOTE — DISCUSSION/SUMMARY
[FreeTextEntry1] : Mr. Vega has a history of coronary artery disease, dating back to 2008. He has a history of smoking with associated COPD. He presented in 2015 with exertional dyspnea.  He developed a myocardial infarction following the conclusion of exercise on the treadmill. He presented with symptoms consistent with unstable angina, and is now status post PCI.\par \par Continue his medications. He absolutely must quit smoking. We discussed his home life issues at length, as his wife seems to be abusing prescription medication, which is adding significant stress to his relationship. He promises to recommit himself to quitting smoking.

## 2018-11-26 NOTE — HISTORY OF PRESENT ILLNESS
[FreeTextEntry1] : Anibal Vega presented to the office today for a cardiovascular evaluation. He was last seen in the office 2 weeks ago.\par \par He is now 58 years old, with a history of coronary artery disease. In March of 2008, he developed unstable angina. He was transferred to [facility] [place], where he was found to have a 90% stenosis in his proximal RCA, which was treated with a bare-metal stent. Nonobstructive disease was identified within his LAD at that time.  He had another stress test performed February 23, 2015. After concluding the test, he developed ST segment elevations, and was transferred emergently to [facility] [place]. He was found to have an occluded circumflex with insignificant in-stent restenosis within the RCA stent. Moderate disease was found more distally in the RCA, as well as in the LAD.  Primary angioplasty was performed.  A bare-metal stent was implanted because of a history of bright red blood per rectum.  He has a history of smoking. He has a history of obstructive sleep apnea.  He has been diagnosed with COPD, and he has been taking inhalers intermittently.\par \par At the time of the last visit, he reported symptoms consistent with unstable angina. He was referred for urgent cardiac catheterization which revealed severe disease of the RCA, for which PCI was performed.\par \par He's been doing reasonably well since then, without any recurrent angina. Because of some issues with his wife, he's been smoking again, although he is going back on the nicotine patch this evening. He's feeling an occasional "jumping" in his chest, without associated dizziness. He denies syncope. He been compliant with his medications.

## 2018-12-04 ENCOUNTER — APPOINTMENT (OUTPATIENT)
Dept: CARDIOLOGY | Facility: CLINIC | Age: 58
End: 2018-12-04
Payer: COMMERCIAL

## 2018-12-04 PROCEDURE — 93224 XTRNL ECG REC UP TO 48 HRS: CPT

## 2018-12-10 ENCOUNTER — RX RENEWAL (OUTPATIENT)
Age: 58
End: 2018-12-10

## 2019-01-14 ENCOUNTER — RX RENEWAL (OUTPATIENT)
Age: 59
End: 2019-01-14

## 2019-01-25 ENCOUNTER — APPOINTMENT (OUTPATIENT)
Dept: PULMONOLOGY | Facility: CLINIC | Age: 59
End: 2019-01-25

## 2019-01-28 ENCOUNTER — CHART COPY (OUTPATIENT)
Age: 59
End: 2019-01-28

## 2019-02-04 ENCOUNTER — RX RENEWAL (OUTPATIENT)
Age: 59
End: 2019-02-04

## 2019-02-19 ENCOUNTER — APPOINTMENT (OUTPATIENT)
Dept: CARDIOLOGY | Facility: CLINIC | Age: 59
End: 2019-02-19
Payer: COMMERCIAL

## 2019-02-19 ENCOUNTER — NON-APPOINTMENT (OUTPATIENT)
Age: 59
End: 2019-02-19

## 2019-02-19 VITALS
HEIGHT: 70 IN | DIASTOLIC BLOOD PRESSURE: 83 MMHG | WEIGHT: 225 LBS | SYSTOLIC BLOOD PRESSURE: 146 MMHG | OXYGEN SATURATION: 95 % | BODY MASS INDEX: 32.21 KG/M2 | HEART RATE: 64 BPM

## 2019-02-19 PROCEDURE — 99214 OFFICE O/P EST MOD 30 MIN: CPT

## 2019-02-19 PROCEDURE — 93000 ELECTROCARDIOGRAM COMPLETE: CPT

## 2019-02-19 NOTE — HISTORY OF PRESENT ILLNESS
[FreeTextEntry1] : Anibal Vega presented to the office today for a cardiovascular evaluation. He was last seen in the office 3 months ago.\par \par He is now 58 years old, with a history of coronary artery disease. In March of 2008, he developed unstable angina. He was transferred to [facility], where he was found to have a 90% stenosis in his proximal RCA, which was treated with a bare-metal stent. Nonobstructive disease was identified within his LAD at that time.  He had another stress test performed February 23, 2015. After concluding the test, he developed ST segment elevations, and was transferred emergently to [facility]. He was found to have an occluded circumflex with insignificant in-stent restenosis within the RCA stent. Moderate disease was found more distally in the RCA, as well as in the LAD.  Primary angioplasty was performed.  A bare-metal stent was implanted because of a history of bright red blood per rectum.  He has a history of smoking. He has a history of obstructive sleep apnea.  He has been diagnosed with COPD, and he has been taking inhalers intermittently.\par \par At the time of the last visit, he was feeling well.\par \par He's been doing reasonably well since then, without any recurrent angina. He is smoking again, although less than before. He's feeling an occasional "jumping" in his chest, without associated dizziness. This is unchanged.  He denies syncope. He been compliant with his medications. He is working days instead of midnight shifts, and is eating more.

## 2019-02-19 NOTE — DISCUSSION/SUMMARY
[FreeTextEntry1] : Mr. Vega has a history of coronary artery disease, dating back to 2008. He has a history of smoking with associated COPD. He presented in 2015 with exertional dyspnea.  He developed a myocardial infarction following the conclusion of exercise on the treadmill. He recently presented with symptoms consistent with unstable angina, and is now status post PCI.\par \par His blood pressure remains somewhat borderline. I will continue his medications. He gained 7 pounds with his eating habits haven't changed. He will try to lose that weight and be down to only 2 cigarettes a day. He will followup in 3 months.

## 2019-02-21 ENCOUNTER — RX RENEWAL (OUTPATIENT)
Age: 59
End: 2019-02-21

## 2019-04-01 ENCOUNTER — NON-APPOINTMENT (OUTPATIENT)
Age: 59
End: 2019-04-01

## 2019-04-01 ENCOUNTER — APPOINTMENT (OUTPATIENT)
Dept: PULMONOLOGY | Facility: CLINIC | Age: 59
End: 2019-04-01
Payer: COMMERCIAL

## 2019-04-01 VITALS
WEIGHT: 219 LBS | BODY MASS INDEX: 31.35 KG/M2 | SYSTOLIC BLOOD PRESSURE: 130 MMHG | HEIGHT: 70 IN | OXYGEN SATURATION: 96 % | DIASTOLIC BLOOD PRESSURE: 78 MMHG | HEART RATE: 79 BPM | RESPIRATION RATE: 17 BRPM

## 2019-04-01 PROCEDURE — 94010 BREATHING CAPACITY TEST: CPT

## 2019-04-01 PROCEDURE — 99214 OFFICE O/P EST MOD 30 MIN: CPT | Mod: 25

## 2019-04-01 PROCEDURE — 99406 BEHAV CHNG SMOKING 3-10 MIN: CPT

## 2019-04-01 NOTE — REASON FOR VISIT
[Follow-Up] : a follow-up visit [FreeTextEntry1] : abnormal chest CT, COPD, GERD, nicotine addiction, obesity, NAN, SOB

## 2019-04-01 NOTE — PROCEDURE
[FreeTextEntry1] : PFT - spi reveals normal flows; FEV1 is 3.0L which is 82% of predicted, normal flow volume loop \par \par

## 2019-04-01 NOTE — ADDENDUM
[FreeTextEntry1] : Documented by Aniya Correia acting as a scribe for Dr. Gavino Mora on 4/1/2019.\par \par All medical record entries made by the scribe, Aniya Correia, were at my, Dr. Gavino Mora's, direction and personally dictated by me on 4/1/2019. I have reviewed the chart and agree that the record accurately reflects my personal performance of the history, physical exam, assessment and plan. I have also personally directed, reviewed, and agree with the discharge instructions.\par

## 2019-04-01 NOTE — HISTORY OF PRESENT ILLNESS
[FreeTextEntry1] : Mr. Vega is a 58 year old male with a history of abnormal chest CT, COPD, GERD, nicotine addiction, obesity, NAN, SOB and TIA presenting to the office today for a follow up visit. His chief complaint is shortness of breath\par -He states that he has generally been feeling well \par -he states that he has been trying to cut down on smoking - 10/day\par -he reports shortness of breath after walking up stairs or inclines has improved. \par -he states that he has been sleeping well lately - 8 hours/day , using the CPAP \par -he notes that he has not been doing formal exercise but has been more active with physical labor and working outside\par -he reports that his bowels have been regular\par -he states that his sense of smell and taste have been good\par -he notes that he will cough after he smokes a cigarette \par -he reports that his weight has been stable\par -he reports that he does have a constant wheeze\par -he states that he only snores when he does not use his CPAP, which he does use very regularly\par -he reports that he has had no recent travel\par -he denies any headaches, nausea, vomiting, fever, chills, sweats, chest pain, chest pressure, diarrhea, constipation, dysphagia, dizziness, leg swelling, leg pain, itchy eyes, itchy ears, heartburn, reflux, or sour taste in the mouth, sinus congestion, rhinitis, SOB on back or at night

## 2019-05-06 ENCOUNTER — APPOINTMENT (OUTPATIENT)
Dept: CARDIOLOGY | Facility: CLINIC | Age: 59
End: 2019-05-06

## 2019-05-06 ENCOUNTER — RX RENEWAL (OUTPATIENT)
Age: 59
End: 2019-05-06

## 2019-06-17 ENCOUNTER — RX RENEWAL (OUTPATIENT)
Age: 59
End: 2019-06-17

## 2019-08-15 ENCOUNTER — RX RENEWAL (OUTPATIENT)
Age: 59
End: 2019-08-15

## 2019-08-21 ENCOUNTER — FORM ENCOUNTER (OUTPATIENT)
Age: 59
End: 2019-08-21

## 2019-08-22 ENCOUNTER — OUTPATIENT (OUTPATIENT)
Dept: OUTPATIENT SERVICES | Facility: HOSPITAL | Age: 59
LOS: 1 days | End: 2019-08-22
Payer: COMMERCIAL

## 2019-08-22 ENCOUNTER — APPOINTMENT (OUTPATIENT)
Dept: CT IMAGING | Facility: IMAGING CENTER | Age: 59
End: 2019-08-22
Payer: COMMERCIAL

## 2019-08-22 DIAGNOSIS — Z00.8 ENCOUNTER FOR OTHER GENERAL EXAMINATION: ICD-10-CM

## 2019-08-22 DIAGNOSIS — Z95.5 PRESENCE OF CORONARY ANGIOPLASTY IMPLANT AND GRAFT: Chronic | ICD-10-CM

## 2019-08-22 PROCEDURE — 71250 CT THORAX DX C-: CPT | Mod: 26

## 2019-08-22 PROCEDURE — 71250 CT THORAX DX C-: CPT

## 2019-09-20 NOTE — DISCHARGE NOTE ADULT - PHYSICIAN SECTION COMPLETE
PHYSICAL EXAM:  GENERAL: non-toxic appearing; in no respiratory distress  HEAD: Atraumatic, Normocephalic;  EYES: PERRL, EOMs intact b/l w/out deficits  ENMT: Moist membranes, no anterior/posterior, or supraclavicular LAD  CHEST/LUNG: CTAB no wheezes/rhonchi/rales  HEART: RRR no murmur/gallops/rubs  ABDOMEN: +BS, soft, NT, ND  EXTREMITIES: No LE edema, +2 radial pulses b/l  MUSCULOSKELETAL: FROM of all 4 extremities; no back tenderness  NERVOUS SYSTEM:  A&Ox3, No motor deficits or sensory deficits; CNII-XII intact; no focal neurologic deficits  Heme/LYMPH: No ecchymosis or bruising or LAD  SKIN:  No new rashes or skin lesions
Yes

## 2019-10-07 ENCOUNTER — APPOINTMENT (OUTPATIENT)
Dept: PULMONOLOGY | Facility: CLINIC | Age: 59
End: 2019-10-07
Payer: COMMERCIAL

## 2019-10-07 ENCOUNTER — NON-APPOINTMENT (OUTPATIENT)
Age: 59
End: 2019-10-07

## 2019-10-07 VITALS
WEIGHT: 221 LBS | OXYGEN SATURATION: 95 % | SYSTOLIC BLOOD PRESSURE: 146 MMHG | RESPIRATION RATE: 17 BRPM | BODY MASS INDEX: 31.64 KG/M2 | DIASTOLIC BLOOD PRESSURE: 70 MMHG | HEIGHT: 70 IN | HEART RATE: 75 BPM

## 2019-10-07 PROCEDURE — 99406 BEHAV CHNG SMOKING 3-10 MIN: CPT | Mod: 25

## 2019-10-07 PROCEDURE — 95012 NITRIC OXIDE EXP GAS DETER: CPT

## 2019-10-07 PROCEDURE — 99214 OFFICE O/P EST MOD 30 MIN: CPT | Mod: 25

## 2019-10-07 PROCEDURE — 94010 BREATHING CAPACITY TEST: CPT

## 2019-10-07 NOTE — HISTORY OF PRESENT ILLNESS
[FreeTextEntry1] : Mr. Vega is a 59 year old male with a history of abnormal chest CT, COPD, GERD, nicotine addiction, obesity, NAN, SOB and TIA presenting to the office today for a follow up visit. His chief complaint is his weight.\par -he reports feeling generally well\par -he states he sleeps well, and gets enough sleep\par -he notes he hasn't been using the CPAP since he has been sleeping in a different room\par -he reports feeling a flutter in his chest occasionally\par -he notes he is gaining weight due to eating poorly but he has decreased the amount of food intake\par -he notes he doesn’t exercise regularly\par -he notes his BP is high, despite taking medication\par -he states he is stressed about his wife's health\par -he denies taking any new medications, vitamins, or supplements. \par -he notes he is still smoking, but has cut down to less than a pack (1/2 to 3/4 of partial cigarettes)\par -he denies any headaches, nausea, vomiting, fever, chills, sweats, chest pain, chest pressure, diarrhea, constipation, dysphagia, dizziness, sour taste in the mouth, heartburn, reflux

## 2019-10-07 NOTE — ASSESSMENT
[FreeTextEntry1] : Mr. Vega is a 59 years old male with a history of CAD s/p stent #3 11/2018, TIA, COPD, GERD, NAN, obesity, Abnormal CT - still snoring however less shortness of breath. (non-compliant with CPAP).\par SOB is multifactorial due to:\par -obesity\par -COPD\par -CAD\par -poor breathing mechanics\par \par problem 1: COPD\par -continue Anoro at 1 inhalation QD\par \par -COPD is a progressive disease and although it can’t be cured , appropriate management can slow its progression, reduce frequency and severity of exacerbations, and improve symptoms and the patient quality of life. Hospitalizations are the greatest contributor to the total COPD costs and account for up to 87% of total COPD related costs. Exacerbations are the main cause of admissions and subsequently account for the 40-75% of COPD costs. Inhaled maintenance therapy reduces the incidence of exacerbations in patients with stable COPD. Incorrect inhaler use and nonadherence are major obstacles to achieving COPD treatment goals. Many COPD patients have challenges (impaired inhalation, limited dexterity, reduced cognition: that limit their ability to correctly use their COPD treatment devices resulting in reduced symptom control. Of most importance is smoking cessation and early intervention with respiratory illnesses and contemplation for pulmonary rehab to enhance quality of life.\par -Inhaler technique reviewed as well as oral hygiene techniques reviewed with patient. Avoidance of cold air, extremes of temperature, rescue inhaler should be used before exercise. Order of medication reviewed with patient. Recommended use of a cool mist humidifier in the bedroom.\par \par problem 2: CAD s/p stent #3 11/2018\par -continue to follow up with Dr. Conley regularly (recommended)\par \par problem 3: obesity\par -Weight loss, exercise, and diet control were discussed and are highly encouraged. Treatment options were given such as, aqua therapy, and contacting a nutritionist. Recommended to use the elliptical, stationary bike, less use of treadmill.  Obesity is associated with worsening asthma, shortness of breath, and potential for cardiac disease, diabetes, and other underlying medical conditions.\par \par problem 4: poor breathing mechanics\par -Proper breathing techniques were reviewed with an emphasis of exhalation. Patient instructed to breath in for 1 second and out for four seconds. Patient was encouraged to not talk while walking.\par \par problem 5: OSAS\par -continue to use the CPAP machine, tolerating it well (D/w patient again especially for BP control)\par -continue to use Provigil 200 mg QAM\par \par -Sleep apnea is associated with adverse clinical consequences which an affect most organ systems.  Cardiovascular disease risk includes arrhythmias, atrial fibrillation, hypertension, coronary artery disease, and stroke. Metabolic disorders include diabetes type 2, non-alcoholic fatty liver disease. Mood disorder especially depression; and cognitive decline especially in the elderly. Associations with  chronic reflux/Borrero’s esophagus some but not all inclusive. \par -Reasons to assess this include arousal consistent with hypopnea; respiratory events most prominent in REM sleep or supine position; therefore sleep staging and body position are important for accurate diagnosis and estimation of AHI.\par \par problem 6: nicotine addiction  (discussed 10.7.19)\par -recommended to use Nicotine Control center / Wellbutrin \par -Discussed for five minutes with the patient the risks/associations with continued smoking including COPD, emphysema, shortness of breath, renal cancer, bladder cancer, stroke risk, cardiac disease, etc. Smoking cessation was discussed at length and highly encouraged. Various options to aid cessation was discussed including use of Chantix, Nicotrol, nicotine products, laser therapy, hypnosis, Wellbutrin, etc.\par \par problem 7: lung cancer screening (noncompliance) - continue yearly\par -follow up chest CT in 7/2020\par \par problem 8: health maintenance \par -recommended yearly flu shot 2018. (patient refused 10.7.19)\par -recommended strep pneumonia vaccines: Prevnar-13 vaccine, followed by Pneumo vaccine 23 one year following\par -recommended early intervention for URIs\par -recommended regular osteoporosis evaluations\par -recommended early dermatological evaluations\par -recommended after the age of 50 to the age of 70, colonoscopy every 5 years \par \par F/U in 6 months with full PFTs\par He is encouraged to call with any changes, concerns, or questions.

## 2019-10-07 NOTE — PROCEDURE
[FreeTextEntry1] : PFT revealed mild obstructive dysfunction, with a FEV1 of 2.78L, which is 77% of predicted, with a normal flow volume loop \par \par FENO was 17; a normal value being less than 25\par Fractional exhaled nitric oxide (FENO) is regarded as a simple, noninvasive method for assessing eosinophilic airway inflammation. Produced by a variety of cells within the lung, nitric oxide (NO) concentrations are generally low in healthy individuals. However, high concentrations of NO appear to be involved in nonspecific host defense mechanisms and chronic inflammatory diseases such as asthma. The American Thoracic Society (ATS) therefore has recommended using FENO to aid in the diagnosis and monitoring of eosinophilic airway inflammation and asthma, and for identifying steroid responsive individuals whose chronic respiratory symptoms may be caused by airway inflammation. \par \par Chest CT (8.22.19) reveals Emphysema. No suspicious pulmonary nodule. Stable mildly enlarged mediastinal lymph nodes.

## 2019-10-07 NOTE — REVIEW OF SYSTEMS
[Negative] : Sleep Disorder [Palpitations] : palpitations [As Noted in HPI] : as noted in HPI [Fever] : no fever [Chills] : no chills [Chest Discomfort] : no chest discomfort [Heartburn] : no heartburn [Reflux] : no reflux [Dysphagia] : no dysphagia [Nausea] : no nausea [Vomiting] : no vomiting [Constipation] : no constipation [Diarrhea] : no diarrhea [Headache] : no headache [Dizziness] : no dizziness

## 2019-10-07 NOTE — ADDENDUM
[FreeTextEntry1] : Documented by Jeff Montoya acting as a scribe for Dr. Gavino Mora on 10/07/2019.\par \par All medical record entries made by the Scribe were at my, Dr. Gavino Mora's, direction and personally dictated by me on 10/07/2019. I have reviewed the chart and agree that the record accurately reflects my personal performance of the history, physical exam, assessment and plan. I have also personally directed, reviewed, and agree with the discharge instructions.\par

## 2019-10-29 NOTE — DISCHARGE NOTE ADULT - NS AS DC FU CF[facility] LV FUNCTION ASSESSMENT
[address] [facility]  Department of Internal Medicine  Internal Medicine Residency Program  Resident MICU Progress  Note      Patient:  Benson Lr 51 y.o. female MRN: [id number]     Date of Service: 10/28/2019    Allergy: Ketorolac tromethamine; Naproxen; Nsaids; Prochlorperazine edisylate; Reglan [metoclopramide]; Codeine; Levofloxacin; and Percocet [oxycodone-acetaminophen]      Subjective       Patient seen and examined. Patient lost peripheral IV access, woke up. Patient was agitated, wanted ET tube removed. Weaning trial was successful. Patient was extubated today. Objective   Physical Exam:  · Vitals: /82   Pulse 117   Temp 97.9 °F (36.6 °C) (Temporal)   Resp 18   Wt 195 lb 6.4 oz (88.6 kg)   SpO2 98%   BMI 34.61 kg/m²     · I & O - 24hr: In: 461 [I.V.:461]  · Out: 3013 [Urine:2863]    Physical Exam   Constitutional: She is oriented to person, place, and time. She appears well-developed and well-nourished. HENT:   Head: Normocephalic and atraumatic. Eyes: Pupils are equal, round, and reactive to light. Conjunctivae and EOM are normal.   Cardiovascular: Normal rate, regular rhythm, normal heart sounds and intact distal pulses. Pulmonary/Chest: Effort normal and breath sounds normal.   Abdominal: Soft. Bowel sounds are normal. She exhibits no distension. There is no tenderness. Neurological: She is alert and oriented to person, place, and time. Skin: Skin is warm and dry. Psychiatric: She has a normal mood and affect. Her behavior is normal.   Nursing note and vitals reviewed.       Pertinent New Labs & Imaging Studies       CBC with Differential:    Lab Results   Component Value Date    WBC 9.2 10/28/2019    RBC 4.15 10/28/2019    HGB 11.0 10/28/2019    HCT 35.1 10/28/2019     10/28/2019    MCV 84.6 10/28/2019    MCH 26.5 10/28/2019    MCHC 31.3 10/28/2019    RDW 14.7 10/28/2019    SEGSPCT 70 11/30/2013    LYMPHOPCT 17.0 10/28/2019    MONOPCT 3.6 10/28/2019 Wo Contrast    Result Date: 10/26/2019  Patient MRN:  [id number] : 1971 Age: 51 years Gender: Female Order Date:  10/26/2019 10:30 PM TECHNIQUE/NUMBER OF IMAGES/COMPARISON/CLINICAL HISTORY: CT brain Axial images were obtained sagittal and coronal reconstructions History seizures and fall. 445 images Comparison study of . FINDINGS: There is no indication for an acute intracranial hemorrhagic event. There is no indication for a sizable effusion acute or recent insult to the brain parenchyma. There is a left medial cranial fossa arachnoid cyst which is an old finding likely on developmental bases. There is no focal mass defect or midline shift. Images with bone window settings demonstrate no significant findings. Midline structures have unremarkable appearance. No interval change since the previous study of . No indication for an acute intracranial process. Ct Cervical Spine Wo Contrast    Result Date: 10/26/2019  Patient MRN:  [id number] : 1971 Age: 51 years Gender: Female Order Date:  10/26/2019 10:30 PM TECHNIQUE/NUMBER OF IMAGES/COMPARISON/CLINICAL HISTORY: CT cervical Axial with sagittal coronal and oblique reconstructions 50-year-old female patient is seizure trauma injury. Comparison study . FINDINGS: Vertebral bodies have normal height. Disc spaces are well-maintained. Alignment is preserved in the sagittal and coronal images are The odontoid process intact. Articular relation between the occipital condyles and C1 and between C1 and C2 are preserved. All facet joints are well aligned the. There is no bone or soft tissue encroachment of the cervical spine canal or neural foramina. There are intact appearance for pedicles, spinous process and transverse process. All facet joints are well aligned. Patient has a orotracheal tube and orogastric tube. No acute fractures are seen in the cervical spine. Lung apices demonstrate a lateral pleural effusions.      No acute fractures or dislocations identified in the cervical spine. Xr Chest Portable    Result Date: 10/26/2019  Patient MRN:  [id number] : 1971 Age: 51 years Gender: Female Order Date:  10/26/2019 9:45 PM TECHNIQUE/NUMBER OF IMAGES/COMPARISON/CLINICAL HISTORY: Chest AP 1 image, one view Comparison  7 day. After endotracheal tube placement. FINDINGS: Endotracheal tube is in the lower borderline position the level of the lower margin of the aorta, it is proximal to the porfirio. NG tube in good position below diaphragma. Expiratory study causing some crowding of the bronchovascular markings. No sizable infiltrates or consolidations are seen. Endotracheal tube in low borderline position at the level of the lower arch of the aorta, proximal to the porfirio. NG tube in good position Expiratory study, no acute cardiopulmonary process. Cta Chest W Contrast    Result Date: 10/25/2019  LOCATION:200 EXAM: CTA CHEST W CONTRAST COMPARISON: None HISTORY:  Chest pain and tachycardia TECHNIQUE: Axial CT images are obtained of the chest.  Coronal and sagittal reconstructions were obtained with 3-D maximum intensity projection (MIP) reconstructed images. These were performed on a separate workstation with concurrent supervision for detailed evaluation of the pulmonary arteries. CONTRAST: 80 mL Isovue-370 intravenous contrast. FINDINGS: SUPPORT DEVICES: None LUNGS/CENTRAL AIRWAYS/PLEURA: 6 mm perifissural nodule is seen in the right upper lobe. Lungs are otherwise clear PULMONARY ARTERIES: Evaluation is adequate for pulmonary embolus. No filling defects identified to the subsegmental level. HEART/PERICARDIUM/GREAT VESSELS: Cardiac size is normal.  Small pericardial effusion is noted. The great vessels of the chest are normal in caliber. LYMPH NODES: No thoracic adenopathy by size criteria. NECK BASE/CHEST WALL/DIAPHRAGM: No soft tissue lesions or diaphragmatic abnormality.  UPPER ABDOMEN: Limited images through the & Plan     Benson Lr is a 51 y.o. female was tranfered to the MICU for status epilepticus and failure to protect airway     Neurologic  1. Status epilepticus 2/2 noncompliance  · Neurology on board  · Continue Keppra 1.5 mg twice daily, Vimpat 100 mg  · CT head showed no acute intracranial bleeds or processes  · Follow up continuous EEG     2. Unwitnessed fall with LOC  · CT cervical spine was negative for acute fractures or intra-spinal processes  · CT head without contrast was negative for acute intracranial events     Cardiology  1. Chest pain  · Told that patient initially complained of chest pain  · Neurological stress test was negative for chest pain and EKG changes  · Lexiscan showed reversible inferior wall defect, EF 69% with no wall motion abnormalities and reversible defect in inferior wall. 2. HTN  · Continue home losartan    3. Hyperlipidemia  · Continue home pravastatin     Pulmonary  1. Acute respiratory failure 2/2 inability to protect airway 2/2 status epilepticus  -resolved  · Patient was extubated today 10/28/2019  · Patient is awake and alert     Infectious disease  UTI vs colonization  · UA shows positive nitrates small leukocyte esterase 5-10 WBCs many bacteria  · Culture positive for E. coli  · History of ESBL  · Afebrile without leucocytosis  · Antibiotics not indicated at this time     · Hematology/Oncology  1. Anemia  · Initial hemoglobin 11.8  · Currently 11  · We will continue to monitor CBC    DVT/GI prophylaxis: Enoxaparin/PPI  Disposition: MICU    Paulino Lr MD, PGY-1    Attending physician: Dr. Candice Wilson    Attending Physician Attestation: Dr. Magda Wellingtonks you very much for allowing me to see this patient in consultation and follow up.     I personally saw, examined and provided care for the patient. Radiographs, labs and medication list were reviewed by me independently. I spoke with bedside nursing, respiratory therapists and consultants.  Critical care services and times documented are independent of procedures and multidisciplinary rounds with Residents. Additionally comprehensive, multidisciplinary rounds were conducted with the MICU team. The case was discussed in detail and plans for care were established. Review of Residents documentation was conducted and revisions were made as appropriate. I agree with the the above documented information.      ASSESSMENT:  1.) Status Epilepticus vs Psychogenic Seizures   2.) Acute Respiratory Failure with Hypoxia      In addition the following applies:     Check: reviewed   Medication Alterations: N/A   Procedures: EEG  Imaging: reviewed  New Consultations: N/A  VENT: ACVC, wean, extubation to room air       - look to transfer patient out of ICU level of care     Access: peripheral   Consults: Neurology, Cardiology   Drips: N/A     Thank you for allowing me to participate in the care of this patient.     Care reviewed with nursing staff, medical and surgical specialty care, primary care and the patient's family as available. Restraints are ordered when the patient can do harm to him/herself by pulling out devices.     Critical care time spent reviewing labs/films, examining patient, collaborating with other physicians but excluding procedures for life threatening organ failure is:    Chart review/lab review/X-ray viewing/documentation: 10 minutes  Assessment: 10 minutes  Conversation patient/family re: prognosis, care options and any end of life issues: 10 minutes  Conversation with staff: 5 minutes     Critical Care Time: > 35 minutes excluding procedures    Poornima Duncan M.D.  [place]yon  10/28/2019  7:04 PM no

## 2019-10-31 ENCOUNTER — RX RENEWAL (OUTPATIENT)
Age: 59
End: 2019-10-31

## 2019-11-26 ENCOUNTER — RX RENEWAL (OUTPATIENT)
Age: 59
End: 2019-11-26

## 2020-01-22 ENCOUNTER — RX RENEWAL (OUTPATIENT)
Age: 60
End: 2020-01-22

## 2020-02-04 ENCOUNTER — RX RENEWAL (OUTPATIENT)
Age: 60
End: 2020-02-04

## 2020-02-21 ENCOUNTER — RX RENEWAL (OUTPATIENT)
Age: 60
End: 2020-02-21

## 2020-03-10 ENCOUNTER — APPOINTMENT (OUTPATIENT)
Dept: CARDIOLOGY | Facility: CLINIC | Age: 60
End: 2020-03-10
Payer: COMMERCIAL

## 2020-03-10 ENCOUNTER — NON-APPOINTMENT (OUTPATIENT)
Age: 60
End: 2020-03-10

## 2020-03-10 VITALS
DIASTOLIC BLOOD PRESSURE: 86 MMHG | HEIGHT: 70 IN | HEART RATE: 83 BPM | OXYGEN SATURATION: 95 % | WEIGHT: 225 LBS | BODY MASS INDEX: 32.21 KG/M2 | SYSTOLIC BLOOD PRESSURE: 149 MMHG

## 2020-03-10 PROCEDURE — 99214 OFFICE O/P EST MOD 30 MIN: CPT

## 2020-03-10 PROCEDURE — 93000 ELECTROCARDIOGRAM COMPLETE: CPT

## 2020-03-10 NOTE — HISTORY OF PRESENT ILLNESS
[FreeTextEntry1] : Anibal Vega presented to the office today for a cardiovascular evaluation. He was last seen in the office 3 months ago.\par \par He is now 58 years old, with a history of coronary artery disease. In March of 2008, he developed unstable angina. He was transferred to [facility], where he was found to have a 90% stenosis in his proximal RCA, which was treated with a bare-metal stent. Nonobstructive disease was identified within his LAD at that time.  He had another stress test performed February 23, 2015. After concluding the test, he developed ST segment elevations, and was transferred emergently to [facility]. He was found to have an occluded circumflex with insignificant in-stent restenosis within the RCA stent. Moderate disease was found more distally in the RCA, as well as in the LAD.  Primary angioplasty was performed.  A bare-metal stent was implanted because of a history of bright red blood per rectum.  He has a history of smoking. He has a history of obstructive sleep apnea.  He has been diagnosed with COPD, and he has been taking inhalers intermittently.\par \par At the time of the last visit, he was feeling well.\par \par He's been doing reasonably well since then, without any recurrent angina. He is smoking again, although less than before. He's feeling an occasional "jumping" in his chest, without associated dizziness. This is unchanged.  He denies syncope. He been compliant with his medications. He is working days instead of midnight shifts, and is eating more.
Home

## 2020-03-10 NOTE — DISCUSSION/SUMMARY
[FreeTextEntry1] : Mr. Vega has a history of coronary artery disease, dating back to 2008. He has a history of smoking with associated COPD. He presented in 2015 with exertional dyspnea.  He developed a myocardial infarction following the conclusion of exercise on the treadmill. He recently presented with symptoms consistent with unstable angina, and is now status post PCI.\par \par His blood pressure remains somewhat borderline. I will continue his medications. He gained more weight and his eating habits haven't changed. He will try to lose that weight and smoke less.

## 2020-04-23 ENCOUNTER — RX RENEWAL (OUTPATIENT)
Age: 60
End: 2020-04-23

## 2020-05-05 ENCOUNTER — RX CHANGE (OUTPATIENT)
Age: 60
End: 2020-05-05

## 2020-06-15 LAB
ALBUMIN SERPL ELPH-MCNC: 4.1 G/DL
ALP BLD-CCNC: 92 U/L
ALT SERPL-CCNC: 24 U/L
ANION GAP SERPL CALC-SCNC: 18 MMOL/L
AST SERPL-CCNC: 25 U/L
BASOPHILS # BLD AUTO: 0.1 K/UL
BASOPHILS NFR BLD AUTO: 0.9 %
BILIRUB SERPL-MCNC: 0.3 MG/DL
BUN SERPL-MCNC: 14 MG/DL
CALCIUM SERPL-MCNC: 10 MG/DL
CHLORIDE SERPL-SCNC: 103 MMOL/L
CHOLEST SERPL-MCNC: 154 MG/DL
CHOLEST/HDLC SERPL: 4.8 RATIO
CO2 SERPL-SCNC: 20 MMOL/L
CREAT SERPL-MCNC: 1.16 MG/DL
EOSINOPHIL # BLD AUTO: 0.35 K/UL
EOSINOPHIL NFR BLD AUTO: 3 %
ESTIMATED AVERAGE GLUCOSE: 134 MG/DL
GLUCOSE SERPL-MCNC: 144 MG/DL
HBA1C MFR BLD HPLC: 6.3 %
HCT VFR BLD CALC: 48 %
HDLC SERPL-MCNC: 32 MG/DL
HGB BLD-MCNC: 15.5 G/DL
IMM GRANULOCYTES NFR BLD AUTO: 0.9 %
LDLC SERPL CALC-MCNC: 92 MG/DL
LYMPHOCYTES # BLD AUTO: 3.13 K/UL
LYMPHOCYTES NFR BLD AUTO: 26.8 %
MAN DIFF?: NORMAL
MCHC RBC-ENTMCNC: 30.1 PG
MCHC RBC-ENTMCNC: 32.3 GM/DL
MCV RBC AUTO: 93.2 FL
MONOCYTES # BLD AUTO: 0.75 K/UL
MONOCYTES NFR BLD AUTO: 6.4 %
NEUTROPHILS # BLD AUTO: 7.23 K/UL
NEUTROPHILS NFR BLD AUTO: 62 %
PLATELET # BLD AUTO: 270 K/UL
POTASSIUM SERPL-SCNC: 3.8 MMOL/L
PROT SERPL-MCNC: 6.8 G/DL
RBC # BLD: 5.15 M/UL
RBC # FLD: 12.8 %
SODIUM SERPL-SCNC: 141 MMOL/L
TRIGL SERPL-MCNC: 147 MG/DL
TSH SERPL-ACNC: 1.73 UIU/ML
WBC # FLD AUTO: 11.66 K/UL

## 2020-06-28 ENCOUNTER — RX RENEWAL (OUTPATIENT)
Age: 60
End: 2020-06-28

## 2020-06-30 ENCOUNTER — NON-APPOINTMENT (OUTPATIENT)
Age: 60
End: 2020-06-30

## 2020-06-30 ENCOUNTER — APPOINTMENT (OUTPATIENT)
Dept: CARDIOLOGY | Facility: CLINIC | Age: 60
End: 2020-06-30
Payer: COMMERCIAL

## 2020-06-30 VITALS
BODY MASS INDEX: 31.92 KG/M2 | OXYGEN SATURATION: 95 % | SYSTOLIC BLOOD PRESSURE: 143 MMHG | HEART RATE: 72 BPM | HEIGHT: 70 IN | DIASTOLIC BLOOD PRESSURE: 78 MMHG | WEIGHT: 223 LBS

## 2020-06-30 PROCEDURE — 93000 ELECTROCARDIOGRAM COMPLETE: CPT

## 2020-06-30 PROCEDURE — 99214 OFFICE O/P EST MOD 30 MIN: CPT

## 2020-06-30 NOTE — HISTORY OF PRESENT ILLNESS
[FreeTextEntry1] : Anibal Vega presented to the office today for a cardiovascular evaluation. He was last seen in the office 3 months ago.\par \par He is now 60 years old, with a history of coronary artery disease. In March of 2008, he developed unstable angina. He was transferred to [facility], where he was found to have a 90% stenosis in his proximal RCA, which was treated with a bare-metal stent. Nonobstructive disease was identified within his LAD at that time.  He had another stress test performed February 23, 2015. After concluding the test, he developed ST segment elevations, and was transferred emergently to [facility]. He was found to have an occluded circumflex with insignificant in-stent restenosis within the RCA stent. Moderate disease was found more distally in the RCA, as well as in the LAD.  Primary angioplasty was performed.  A bare-metal stent was implanted because of a history of bright red blood per rectum.  He has a history of smoking. He has a history of obstructive sleep apnea.  He has been diagnosed with COPD, and he has been taking inhalers intermittently.\par \par At the time of the last visit, he was feeling well.\par \par He's been doing reasonably well since then, without any recurrent angina. He is smoking. He's feeling an occasional "jumping" in his chest, without associated dizziness. This is unchanged.  He denies syncope. He been compliant with his medications. He lost 2 pounds.  He wants to try Chantix.

## 2020-06-30 NOTE — DISCUSSION/SUMMARY
[FreeTextEntry1] : Mr. Vega has a history of coronary artery disease, dating back to 2008. He has a history of smoking with associated COPD. He presented in 2015 with exertional dyspnea.  He developed a myocardial infarction following the conclusion of exercise on the treadmill. In 2018 he presented with symptoms consistent with unstable angina, and is status post PCI.\par \par I will continue his medications. He wants to try Chantix and this was prescribed.  He will schedule an echo and a pharm stress.

## 2020-06-30 NOTE — PHYSICAL EXAM
[General Appearance - Well Developed] : well developed [Normal Appearance] : normal appearance [Well Groomed] : well groomed [General Appearance - Well Nourished] : well nourished [No Deformities] : no deformities [General Appearance - In No Acute Distress] : no acute distress [Normal Oral Mucosa] : normal oral mucosa [Normal Conjunctiva] : the conjunctiva exhibited no abnormalities [Eyelids - No Xanthelasma] : the eyelids demonstrated no xanthelasmas [No Oral Cyanosis] : no oral cyanosis [No Oral Pallor] : no oral pallor [Normal Jugular Venous A Waves Present] : normal jugular venous A waves present [Normal Jugular Venous V Waves Present] : normal jugular venous V waves present [No Jugular Venous Youngblood A Waves] : no jugular venous youngblood A waves [Respiration, Rhythm And Depth] : normal respiratory rhythm and effort [Exaggerated Use Of Accessory Muscles For Inspiration] : no accessory muscle use [Auscultation Breath Sounds / Voice Sounds] : lungs were clear to auscultation bilaterally [Abdomen Soft] : soft [Abdomen Mass (___ Cm)] : no abdominal mass palpated [Abdomen Tenderness] : non-tender [Abnormal Walk] : normal gait [Gait - Sufficient For Exercise Testing] : the gait was sufficient for exercise testing [Cyanosis, Localized] : no localized cyanosis [Nail Clubbing] : no clubbing of the fingernails [] : no ischemic changes [Skin Color & Pigmentation] : normal skin color and pigmentation [Petechial Hemorrhages (___cm)] : no petechial hemorrhages [No Venous Stasis] : no venous stasis [Skin Lesions] : no skin lesions [No Skin Ulcers] : no skin ulcer [No Xanthoma] : no  xanthoma was observed [Oriented To Time, Place, And Person] : oriented to person, place, and time [Affect] : the affect was normal [Mood] : the mood was normal [No Anxiety] : not feeling anxious [Normal Rate] : normal [Normal S1] : normal S1 [Rhythm Regular] : regular [Normal S2] : normal S2 [S4] : no S4 [No Gallop] : no gallop heard [S3] : no S3 [Right Carotid Bruit] : no bruit heard over the right carotid [Left Femoral Bruit] : no bruit heard over the left femoral artery [Left Carotid Bruit] : left carotid bruit heard [Right Femoral Bruit] : no bruit heard over the right femoral artery [2+] : left 2+ [Bruit] : no bruit heard [No Pitting Edema] : no pitting edema present

## 2020-07-21 ENCOUNTER — APPOINTMENT (OUTPATIENT)
Dept: CARDIOLOGY | Facility: CLINIC | Age: 60
End: 2020-07-21
Payer: COMMERCIAL

## 2020-07-21 PROCEDURE — 93306 TTE W/DOPPLER COMPLETE: CPT

## 2020-07-31 ENCOUNTER — RX RENEWAL (OUTPATIENT)
Age: 60
End: 2020-07-31

## 2020-07-31 RX ORDER — UMECLIDINIUM BROMIDE AND VILANTEROL TRIFENATATE 62.5; 25 UG/1; UG/1
62.5-25 POWDER RESPIRATORY (INHALATION) DAILY
Qty: 3 | Refills: 1 | Status: ACTIVE | COMMUNITY
Start: 2019-04-01 | End: 1900-01-01

## 2020-08-01 ENCOUNTER — RX RENEWAL (OUTPATIENT)
Age: 60
End: 2020-08-01

## 2020-08-03 ENCOUNTER — APPOINTMENT (OUTPATIENT)
Dept: PULMONOLOGY | Facility: CLINIC | Age: 60
End: 2020-08-03
Payer: COMMERCIAL

## 2020-08-03 PROCEDURE — 99214 OFFICE O/P EST MOD 30 MIN: CPT | Mod: 95

## 2020-08-03 NOTE — ASSESSMENT
[FreeTextEntry1] : Mr. Vega is a 60 years old male with a history of CAD s/p stent #3 11/2018, TIA, COPD, GERD, NAN, obesity, Abnormal CT - still snoring however less shortness of breath. (non-compliant with CPAP) - stress due to Wife's memory - still smoking. \par \par SOB is multifactorial due to:\par -obesity\par -COPD\par -CAD\par -poor breathing mechanics\par \par problem 1: COPD\par -continue Anoro at 1 inhalation QD\par \par -COPD is a progressive disease and although it can’t be cured , appropriate management can slow its progression, reduce frequency and severity of exacerbations, and improve symptoms and the patient quality of life. Hospitalizations are the greatest contributor to the total COPD costs and account for up to 87% of total COPD related costs. Exacerbations are the main cause of admissions and subsequently account for the 40-75% of COPD costs. Inhaled maintenance therapy reduces the incidence of exacerbations in patients with stable COPD. Incorrect inhaler use and nonadherence are major obstacles to achieving COPD treatment goals. Many COPD patients have challenges (impaired inhalation, limited dexterity, reduced cognition: that limit their ability to correctly use their COPD treatment devices resulting in reduced symptom control. Of most importance is smoking cessation and early intervention with respiratory illnesses and contemplation for pulmonary rehab to enhance quality of life.\par -Inhaler technique reviewed as well as oral hygiene techniques reviewed with patient. Avoidance of cold air, extremes of temperature, rescue inhaler should be used before exercise. Order of medication reviewed with patient. Recommended use of a cool mist humidifier in the bedroom.\par \par problem 2: CAD s/p stent #3 11/2018\par -continue to follow up with Dr. Conley regularly (recommended)\par \par problem 3: obesity\par -Weight loss, exercise, and diet control were discussed and are highly encouraged. Treatment options were given such as, aqua therapy, and contacting a nutritionist. Recommended to use the elliptical, stationary bike, less use of treadmill.  Obesity is associated with worsening asthma, shortness of breath, and potential for cardiac disease, diabetes, and other underlying medical conditions.\par \par problem 4: poor breathing mechanics\par -Proper breathing techniques were reviewed with an emphasis of exhalation. Patient instructed to breath in for 1 second and out for four seconds. Patient was encouraged to not talk while walking.\par \par problem 5: OSAS\par -continue to use the CPAP machine, tolerating it well (D/w patient again especially for BP control)\par -continue to use Provigil 200 mg QAM\par \par -Sleep apnea is associated with adverse clinical consequences which an affect most organ systems.  Cardiovascular disease risk includes arrhythmias, atrial fibrillation, hypertension, coronary artery disease, and stroke. Metabolic disorders include diabetes type 2, non-alcoholic fatty liver disease. Mood disorder especially depression; and cognitive decline especially in the elderly. Associations with  chronic reflux/Borrero’s esophagus some but not all inclusive. \par -Reasons to assess this include arousal consistent with hypopnea; respiratory events most prominent in REM sleep or supine position; therefore sleep staging and body position are important for accurate diagnosis and estimation of AHI.\par \par problem 6: nicotine addiction  (discussed 08.03.2020)\par -recommended to use Nicotine Control center / Wellbutrin \par -Discussed for five minutes with the patient the risks/associations with continued smoking including COPD, emphysema, shortness of breath, renal cancer, bladder cancer, stroke risk, cardiac disease, etc. Smoking cessation was discussed at length and highly encouraged. Various options to aid cessation was discussed including use of Chantix, Nicotrol, nicotine products, laser therapy, hypnosis, Wellbutrin, etc.\par \par problem 7: lung cancer screening (noncompliance) - continue yearly\par -follow up chest CT in 7/2020  (DUE)\par \par problem 8: health maintenance \par -recommended yearly flu shot 2018. (patient refused 10.7.19)\par -recommended strep pneumonia vaccines: Prevnar-13 vaccine, followed by Pneumo vaccine 23 one year following\par -recommended early intervention for URIs\par -recommended regular osteoporosis evaluations\par -recommended early dermatological evaluations\par -recommended after the age of 50 to the age of 70, colonoscopy every 5 years \par \par F/U in 6 months with full PFTs\par He is encouraged to call with any changes, concerns, or questions.

## 2020-08-03 NOTE — REASON FOR VISIT
[Follow-Up] : a follow-up visit [FreeTextEntry1] : video call- abnormal chest CT, COPD, GERD, nicotine addiction, obesity, NAN, SOB

## 2020-08-03 NOTE — ADDENDUM
[FreeTextEntry1] : Documented by Broderick Viramontes acting as a scribe for Dr. Gavino Mora on 08/03/2020 \par \par All medical record entries made by the Scribe were at my, Dr. Gavino Mora's, direction and personally dictated by me on 08/03/2020 . I have reviewed the chart and agree that the record accurately reflects my personal performance of the history, physical exam, assessment and plan. I have also personally directed, reviewed, and agree with the discharge instructions.

## 2020-08-03 NOTE — HISTORY OF PRESENT ILLNESS
[Home] : at home, [unfilled] , at the time of the visit. [Medical Office: ([facility])___] : at the medical office located in  [Verbal consent obtained from patient] : the patient, [unfilled] [FreeTextEntry1] : Mr. Vega is a 60 year old male with a history of abnormal chest CT, COPD, GERD, nicotine addiction, obesity, NAN, SOB and TIA video calling to the office today for a follow up visit. His chief complaint is stress due to Wife's health. \par -he reports that he has been feeling well. \par -he states that he is very active but does not formally exercise. \par -He states that he needs to lose some weight and stop smoking. \par -he notes that his energy level is alright being 7/10. \par -he states that he has been feeling overwhelmed with his wife losing her cognitive skills. \par -There are no visual issues.\par -he notes that His bowels are regular.\par -he notes that he feels bloated. \par -when he cut down on smoking, his cough and wheeze had gotten better. \par -his voice has been raspy due to smoking. \par \par -he denies any chest pain, chest pressure, diarrhea, constipation, dysphagia, dizziness, sour taste in the mouth, leg swelling, leg pain, itchy eyes, itchy ears, heartburn, reflux, myalgias or arthralgias.

## 2020-08-04 ENCOUNTER — RX RENEWAL (OUTPATIENT)
Age: 60
End: 2020-08-04

## 2020-08-12 ENCOUNTER — APPOINTMENT (OUTPATIENT)
Dept: CARDIOLOGY | Facility: CLINIC | Age: 60
End: 2020-08-12
Payer: COMMERCIAL

## 2020-08-12 PROCEDURE — 78452 HT MUSCLE IMAGE SPECT MULT: CPT

## 2020-08-12 PROCEDURE — A9500: CPT

## 2020-08-12 PROCEDURE — 93015 CV STRESS TEST SUPVJ I&R: CPT

## 2020-08-24 NOTE — PATIENT PROFILE ADULT - VISION (WITH CORRECTIVE LENSES IF THE PATIENT USUALLY WEARS THEM):
Normal vision: sees adequately in most situations; can see medication labels, newsprint Cimetidine Counseling:  I discussed with the patient the risks of Cimetidine including but not limited to gynecomastia, headache, diarrhea, nausea, drowsiness, arrhythmias, pancreatitis, skin rashes, psychosis, bone marrow suppression and kidney toxicity.

## 2020-09-02 ENCOUNTER — RX RENEWAL (OUTPATIENT)
Age: 60
End: 2020-09-02

## 2020-09-24 ENCOUNTER — APPOINTMENT (OUTPATIENT)
Dept: CARDIOLOGY | Facility: CLINIC | Age: 60
End: 2020-09-24

## 2020-09-30 ENCOUNTER — RX RENEWAL (OUTPATIENT)
Age: 60
End: 2020-09-30

## 2020-10-02 ENCOUNTER — RX RENEWAL (OUTPATIENT)
Age: 60
End: 2020-10-02

## 2020-11-29 ENCOUNTER — RX RENEWAL (OUTPATIENT)
Age: 60
End: 2020-11-29

## 2020-11-30 ENCOUNTER — RX RENEWAL (OUTPATIENT)
Age: 60
End: 2020-11-30

## 2020-12-03 ENCOUNTER — RX RENEWAL (OUTPATIENT)
Age: 60
End: 2020-12-03

## 2021-01-01 ENCOUNTER — RX RENEWAL (OUTPATIENT)
Age: 61
End: 2021-01-01

## 2021-01-23 ENCOUNTER — RX RENEWAL (OUTPATIENT)
Age: 61
End: 2021-01-23

## 2021-01-25 ENCOUNTER — RX RENEWAL (OUTPATIENT)
Age: 61
End: 2021-01-25

## 2021-01-30 ENCOUNTER — RX RENEWAL (OUTPATIENT)
Age: 61
End: 2021-01-30

## 2021-02-02 ENCOUNTER — RX RENEWAL (OUTPATIENT)
Age: 61
End: 2021-02-02

## 2021-03-03 ENCOUNTER — RX RENEWAL (OUTPATIENT)
Age: 61
End: 2021-03-03

## 2021-03-17 ENCOUNTER — TRANSCRIPTION ENCOUNTER (OUTPATIENT)
Age: 61
End: 2021-03-17

## 2021-03-22 ENCOUNTER — APPOINTMENT (OUTPATIENT)
Dept: PULMONOLOGY | Facility: CLINIC | Age: 61
End: 2021-03-22
Payer: COMMERCIAL

## 2021-03-22 VITALS — HEIGHT: 70 IN | BODY MASS INDEX: 31.78 KG/M2 | WEIGHT: 222 LBS

## 2021-03-22 PROCEDURE — G0296 VISIT TO DETERM LDCT ELIG: CPT | Mod: 95

## 2021-03-22 PROCEDURE — 99214 OFFICE O/P EST MOD 30 MIN: CPT | Mod: 25,95

## 2021-03-22 PROCEDURE — 99406 BEHAV CHNG SMOKING 3-10 MIN: CPT | Mod: 95

## 2021-03-22 NOTE — ADDENDUM
[FreeTextEntry1] : Documented by Anibal Morrison acting as a scribe for Dr. Gavino Mora on 03/22/2021.\par \par All medical record entries made by the Scribe were at my, Dr. Gavino Mora's, direction and personally dictated by me on 03/22/2021 . I have reviewed the chart and agree that the record accurately reflects my personal performance of the history, physical exam, assessment and plan. I have also personally directed, reviewed, and agree with the discharge instructions. \par

## 2021-03-22 NOTE — HISTORY OF PRESENT ILLNESS
[Home] : at home, [unfilled] , at the time of the visit. [Medical Office: ([facility])___] : at the medical office located in  [Verbal consent obtained from patient] : the patient, [unfilled] [FreeTextEntry1] : Mr. Vega is a 60 year old male with a history of abnormal chest CT, COPD, GERD, nicotine addiction, obesity, NAN, SOB and TIA video calling to the office today for a follow up visit. His chief complaint is\par \par -he notes generally feeling well\par -he notes regular bowel movements \par -he denies regular exercise\par -he notes weight increased exacerbated by uncontrolled diet\par -he notes intermittent dysphonia exacerbated by smoking\par -he notes till smoking 0.75 pack a day\par - He  notes nightly compliance with CPAP, tolerating well, and benefitting from therapy \par \par -denies any chest pain, chest pressure, diarrhea, constipation, dysphagia, sour taste in the mouth, dizziness, leg swelling, leg pain, myalgias, arthralgias, itchy eyes, itchy ears, heartburn, or reflux.\par \par

## 2021-03-22 NOTE — ASSESSMENT
[FreeTextEntry1] : Mr. Vega is a 60 years old male with a history of CAD s/p stent #3 11/2018, TIA, COPD, GERD, NAN, obesity, Abnormal CT - still snoring however less shortness of breath. (non-compliant with CPAP) - stress due to Wife's memory - still smoking. \par \par SOB is multifactorial due to:\par -obesity\par -COPD\par -CAD\par -poor breathing mechanics\par \par problem 1: COPD\par -continue Anoro at 1 inhalation QD\par \par -COPD is a progressive disease and although it can’t be cured , appropriate management can slow its progression, reduce frequency and severity of exacerbations, and improve symptoms and the patient quality of life. Hospitalizations are the greatest contributor to the total COPD costs and account for up to 87% of total COPD related costs. Exacerbations are the main cause of admissions and subsequently account for the 40-75% of COPD costs. Inhaled maintenance therapy reduces the incidence of exacerbations in patients with stable COPD. Incorrect inhaler use and nonadherence are major obstacles to achieving COPD treatment goals. Many COPD patients have challenges (impaired inhalation, limited dexterity, reduced cognition: that limit their ability to correctly use their COPD treatment devices resulting in reduced symptom control. Of most importance is smoking cessation and early intervention with respiratory illnesses and contemplation for pulmonary rehab to enhance quality of life.\par -Inhaler technique reviewed as well as oral hygiene techniques reviewed with patient. Avoidance of cold air, extremes of temperature, rescue inhaler should be used before exercise. Order of medication reviewed with patient. Recommended use of a cool mist humidifier in the bedroom.\par \par problem 2: CAD s/p stent #3 11/2018\par -continue to follow up with Dr. Conley regularly (recommended)\par \par problem 3: Allergy/ Sinus/ PND\par -Add Olopatadine 0.6% at 1 sniff/nostril BID \par Environmental measures for allergies were encouraged including mattress and pillow covers, air purifier, and environmental controls. \par \par problem 4: obesity\par -Weight loss, exercise, and diet control were discussed and are highly encouraged. Treatment options were given such as, aqua therapy, and contacting a nutritionist. Recommended to use the elliptical, stationary bike, less use of treadmill.  Obesity is associated with worsening asthma, shortness of breath, and potential for cardiac disease, diabetes, and other underlying medical conditions.\par \par problem 5: poor breathing mechanics\par -Proper breathing techniques were reviewed with an emphasis of exhalation. Patient instructed to breath in for 1 second and out for four seconds. Patient was encouraged to not talk while walking.\par \par problem 6: OSAS\par -continue to use the CPAP machine, tolerating it well (D/w patient again especially for BP control)\par -continue to use Provigil 200 mg QAM\par \par -Sleep apnea is associated with adverse clinical consequences which an affect most organ systems.  Cardiovascular disease risk includes arrhythmias, atrial fibrillation, hypertension, coronary artery disease, and stroke. Metabolic disorders include diabetes type 2, non-alcoholic fatty liver disease. Mood disorder especially depression; and cognitive decline especially in the elderly. Associations with  chronic reflux/Borrero’s esophagus some but not all inclusive. \par -Reasons to assess this include arousal consistent with hypopnea; respiratory events most prominent in REM sleep or supine position; therefore sleep staging and body position are important for accurate diagnosis and estimation of AHI.\par \par problem 7: nicotine addiction  (discussed 03.22.2021) \par -recommended to use Nicotine Control center / Wellbutrin \par -Discussed for five minutes with the patient the risks/associations with continued smoking including COPD, emphysema, shortness of breath, renal cancer, bladder cancer, stroke risk, cardiac disease, etc. Smoking cessation was discussed at length and highly encouraged. Various options to aid cessation was discussed including use of Chantix, Nicotrol, nicotine products, laser therapy, hypnosis, Wellbutrin, etc.\par \par problem 8: lung cancer screening (noncompliance) - continue yearly\par -follow up chest CT in 7/2020  (DUE) (Rescripted 3.22.2021) \par Lung cancer screening is recommended for people between the ages of 55 and 80 with prior 30+ pack year smoking histories. There is irrefutable evidence for realization of lung cancer screening based on two large randomized control trials demonstrating relative reduction in lung cancer mortality for patients undergoing low-dose CT scanning. Risks and benefits reviewed with the patient.\par \par problem 9: health maintenance \par -s/p COVID 19 Vaccine\par -recommended yearly flu shot (refused)\par -recommended strep pneumonia vaccines: Prevnar-13 vaccine, followed by Pneumo vaccine 23 one year following\par -recommended early intervention for URIs\par -recommended regular osteoporosis evaluations\par -recommended early dermatological evaluations\par -recommended after the age of 50 to the age of 70, colonoscopy every 5 years \par \par F/U in 6 months with full PFTs\par He is encouraged to call with any changes, concerns, or questions.

## 2021-06-28 ENCOUNTER — RX RENEWAL (OUTPATIENT)
Age: 61
End: 2021-06-28

## 2021-07-11 ENCOUNTER — RX RENEWAL (OUTPATIENT)
Age: 61
End: 2021-07-11

## 2021-07-29 ENCOUNTER — NON-APPOINTMENT (OUTPATIENT)
Age: 61
End: 2021-07-29

## 2021-07-29 ENCOUNTER — APPOINTMENT (OUTPATIENT)
Dept: THORACIC SURGERY | Facility: CLINIC | Age: 61
End: 2021-07-29
Payer: COMMERCIAL

## 2021-07-29 VITALS — WEIGHT: 222 LBS | BODY MASS INDEX: 31.78 KG/M2 | HEIGHT: 70 IN

## 2021-07-29 DIAGNOSIS — Z12.2 ENCOUNTER FOR SCREENING FOR MALIGNANT NEOPLASM OF RESPIRATORY ORGANS: ICD-10-CM

## 2021-07-29 PROCEDURE — 99406 BEHAV CHNG SMOKING 3-10 MIN: CPT

## 2021-08-01 ENCOUNTER — RX RENEWAL (OUTPATIENT)
Age: 61
End: 2021-08-01

## 2021-08-01 RX ORDER — OLOPATADINE HYDROCHLORIDE 665 UG/1
0.6 SPRAY, METERED NASAL
Qty: 3 | Refills: 1 | Status: ACTIVE | COMMUNITY
Start: 2021-03-22 | End: 1900-01-01

## 2021-08-02 ENCOUNTER — APPOINTMENT (OUTPATIENT)
Dept: CT IMAGING | Facility: IMAGING CENTER | Age: 61
End: 2021-08-02
Payer: COMMERCIAL

## 2021-08-02 ENCOUNTER — OUTPATIENT (OUTPATIENT)
Dept: OUTPATIENT SERVICES | Facility: HOSPITAL | Age: 61
LOS: 1 days | End: 2021-08-02
Payer: COMMERCIAL

## 2021-08-02 DIAGNOSIS — F17.200 NICOTINE DEPENDENCE, UNSPECIFIED, UNCOMPLICATED: ICD-10-CM

## 2021-08-02 DIAGNOSIS — Z95.5 PRESENCE OF CORONARY ANGIOPLASTY IMPLANT AND GRAFT: Chronic | ICD-10-CM

## 2021-08-02 DIAGNOSIS — Z00.8 ENCOUNTER FOR OTHER GENERAL EXAMINATION: ICD-10-CM

## 2021-08-02 PROCEDURE — 71271 CT THORAX LUNG CANCER SCR C-: CPT

## 2021-08-02 PROCEDURE — 71271 CT THORAX LUNG CANCER SCR C-: CPT | Mod: 26

## 2021-08-03 ENCOUNTER — TRANSCRIPTION ENCOUNTER (OUTPATIENT)
Age: 61
End: 2021-08-03

## 2021-08-03 NOTE — PLAN
[FreeTextEntry1] : His LDCT is scheduled for 8/2/21 at the [facility] location.  He will follow up with Dr. Mora for the results.

## 2021-08-03 NOTE — HISTORY OF PRESENT ILLNESS
[TextBox_13] : He denies hemoptysis, denies new cough, denies unexplained weight loss.\par He is a current smoker with a 48 pack year smoking history (1PPD x 48 years).  He is referred by Dr. Gavino Mora who documented the shared decision making.

## 2021-08-04 ENCOUNTER — NON-APPOINTMENT (OUTPATIENT)
Age: 61
End: 2021-08-04

## 2021-08-04 RX ORDER — ARMODAFINIL 250 MG/1
250 TABLET ORAL
Qty: 30 | Refills: 5 | Status: ACTIVE | COMMUNITY
Start: 2019-08-15 | End: 1900-01-01

## 2021-08-12 NOTE — DISCHARGE NOTE ADULT - ADMISSION DATE +STARTOFVISITDATE
Pt is a 35 year old  male with a history of bipolar 1 disorder with manic episodes. Pt presented to the [facility] ED in police custody for manic behavior. Pt was being aggressive, acting erratic, expressing rapid speech, and attempting to run. Pt was uncooperative during assessment and would fall asleep intermittently. Pt at one point woke up and asked for the remote to the TV because it \"is mine and I'm making sure it will got home with me when I leave.\" Pt has been verbally aggressive to the staff and stating he wants to leave and attempted to leave multiple times. Pt was uncooperative during assessment and would not answer any questions.     Collateral: Pt was brought in by a involuntary peyition by the police due to statements that pt was drunk and arguing with pts wife and a friend. Pts wife reported pt hasn't slept in 3 days. Writer attempted to call emergency contact Vibha at [phone] without success.     Pt was in the [facility] ED 8/10/21 for racing thoughts for 3 days and palpitations. After a some lab work was preformed pt eloped. Police were called and pt was found 8/11/21 and brought back to the ED.     Pt denies SI, HI, AVH at the time of assessment.     Statement Selected

## 2021-09-03 ENCOUNTER — RX RENEWAL (OUTPATIENT)
Age: 61
End: 2021-09-03

## 2021-10-07 ENCOUNTER — APPOINTMENT (OUTPATIENT)
Dept: PULMONOLOGY | Facility: CLINIC | Age: 61
End: 2021-10-07
Payer: COMMERCIAL

## 2021-10-07 VITALS
RESPIRATION RATE: 16 BRPM | WEIGHT: 214 LBS | SYSTOLIC BLOOD PRESSURE: 130 MMHG | HEART RATE: 84 BPM | TEMPERATURE: 97.2 F | HEIGHT: 69 IN | OXYGEN SATURATION: 96 % | BODY MASS INDEX: 31.7 KG/M2 | DIASTOLIC BLOOD PRESSURE: 80 MMHG

## 2021-10-07 PROCEDURE — 99406 BEHAV CHNG SMOKING 3-10 MIN: CPT

## 2021-10-07 PROCEDURE — 99214 OFFICE O/P EST MOD 30 MIN: CPT | Mod: 25

## 2021-10-07 NOTE — PROCEDURE
[FreeTextEntry1] : CT Chest scan (8.2.21) shows Emphysema with peripheral reticulation, groundglass opacity and traction bronchiectasis. Overall groundglass opacity is increased from the prior study and may represent active interstitial lung disease, less likely infectious/inflammatory. No honeycombing. Lung RADS 2S - Benign Appearance or Behavior.  Nodules with a very low likelihood of becoming a clinically active cancer due to size or lack of growth.\par

## 2021-10-07 NOTE — ADDENDUM
[FreeTextEntry1] : Documented by En Up acting as a scribe for Dr. Gavino Mora on (10/07/2021).\par \par All medical record entries made by the Scribe were at my, Dr. Gavino Mora's, direction and personally dictated by me on (10/07/2021). I have reviewed the chart and agree that the record accurately reflects my personal performance of the history, physical exam, assessment and plan. I have also personally directed, reviewed, and agree with the discharge instructions.\par

## 2021-10-07 NOTE — HISTORY OF PRESENT ILLNESS
[FreeTextEntry1] : Mr. Vega is a 61 year old male with a history of abnormal chest CT, COPD, GERD, nicotine addiction, obesity, NAN, SOB and TIA presenting to the office today for a follow up visit. His chief complaint is\par -he notes feeling well \par -he notes not going to the gym \par -he notes walking \par -he denies headaches \par -he notes stable weight \par -he notes concerns about his wife's declining health \par -he denies visual issues \par -he notes coughing possibly because of his smoking habits \par -he notes smoking 75% of a pack \par -he notes he might join the guys around the building on walks \par patient denies any headaches, nausea, vomiting, fever, chills, sweats, chest pain, chest pressure, palpitations, coughing, wheezing, fatigue, diarrhea, constipation, dysphagia, myalgias, dizziness, leg swelling, leg pain, itchy eyes, itchy ears, heartburn, reflux or sour taste in the mouth

## 2021-10-07 NOTE — ASSESSMENT
[FreeTextEntry1] : Mr. Vega is a 61 years old male with a history of CAD s/p stent #3 11/2018, TIA, COPD, GERD, NAN, obesity, Abnormal CT - still snoring however less shortness of breath. (non-compliant with CPAP) - stress due to Wife's memory - still smoking. - CT ILDz?progressive \par \par SOB is multifactorial due to:\par -obesity\par -COPD\par -CAD\par -poor breathing mechanics\par \par problem 1: COPD\par -continue Anoro at 1 inhalation QD\par -COPD is a progressive disease and although it can’t be cured , appropriate management can slow its progression, reduce frequency and severity of exacerbations, and improve symptoms and the patient quality of life. Hospitalizations are the greatest contributor to the total COPD costs and account for up to 87% of total COPD related costs. Exacerbations are the main cause of admissions and subsequently account for the 40-75% of COPD costs. Inhaled maintenance therapy reduces the incidence of exacerbations in patients with stable COPD. Incorrect inhaler use and nonadherence are major obstacles to achieving COPD treatment goals. Many COPD patients have challenges (impaired inhalation, limited dexterity, reduced cognition: that limit their ability to correctly use their COPD treatment devices resulting in reduced symptom control. Of most importance is smoking cessation and early intervention with respiratory illnesses and contemplation for pulmonary rehab to enhance quality of life.\par -Inhaler technique reviewed as well as oral hygiene techniques reviewed with patient. Avoidance of cold air, extremes of temperature, rescue inhaler should be used before exercise. Order of medication reviewed with patient. Recommended use of a cool mist humidifier in the bedroom.\par \par problem 2: CAD s/p stent #3 11/2018\par -continue to follow up with Dr. Conley regularly (recommended)\par \par problem 3: Allergy/ Sinus/ PND\par -Add Olopatadine 0.6% at 1 sniff/nostril BID \par Environmental measures for allergies were encouraged including mattress and pillow covers, air purifier, and environmental controls. \par \par problem 4: obesity\par -Weight loss, exercise, and diet control were discussed and are highly encouraged. Treatment options were given such as, aqua therapy, and contacting a nutritionist. Recommended to use the elliptical, stationary bike, less use of treadmill.  Obesity is associated with worsening asthma, shortness of breath, and potential for cardiac disease, diabetes, and other underlying medical conditions.\par \par problem 5: poor breathing mechanics\par -Proper breathing techniques were reviewed with an emphasis of exhalation. Patient instructed to breath in for 1 second and out for four seconds. Patient was encouraged to not talk while walking.\par \par problem 6: OSAS\par -continue to use the CPAP machine, tolerating it well (D/w patient again especially for BP control)\par -continue to use Provigil 200 mg QAM\par \par -Sleep apnea is associated with adverse clinical consequences which an affect most organ systems.  Cardiovascular disease risk includes arrhythmias, atrial fibrillation, hypertension, coronary artery disease, and stroke. Metabolic disorders include diabetes type 2, non-alcoholic fatty liver disease. Mood disorder especially depression; and cognitive decline especially in the elderly. Associations with  chronic reflux/Borrero’s esophagus some but not all inclusive. \par -Reasons to assess this include arousal consistent with hypopnea; respiratory events most prominent in REM sleep or supine position; therefore sleep staging and body position are important for accurate diagnosis and estimation of AHI.\par \par problem 7: nicotine addiction  (discussed 10.07.2021) \par -recommended to use Nicotine Control center / Wellbutrin \par -Discussed for five minutes with the patient the risks/associations with continued smoking including COPD, emphysema, shortness of breath, renal cancer, bladder cancer, stroke risk, cardiac disease, etc. Smoking cessation was discussed at length and highly encouraged. Various options to aid cessation was discussed including use of Chantix, Nicotrol, nicotine products, laser therapy, hypnosis, Wellbutrin, etc.\par \par problem 8: lung cancer screening (noncompliance) - continue yearly\par -follow up chest CT in 7/2020  (DUE) (Rescripted 3.22.2021) \par Lung cancer screening is recommended for people between the ages of 55 and 80 with prior 30+ pack year smoking histories. There is irrefutable evidence for realization of lung cancer screening based on two large randomized control trials demonstrating relative reduction in lung cancer mortality for patients undergoing low-dose CT scanning. Risks and benefits reviewed with the patient.\par \par Problem 8: Abnormal "ILDz" \par -complete rheumatological blood work \par -Repeat CT 1/2022 \par -If progressive, then complete VATS biopsy \par \par problem 9: health maintenance \par -s/p COVID 19 Vaccine x3\par -recommended yearly flu shot (refused)\par -recommended strep pneumonia vaccines: Prevnar-13 vaccine, followed by Pneumo vaccine 23 one year following\par -recommended early intervention for URIs\par -recommended regular osteoporosis evaluations\par -recommended early dermatological evaluations\par -recommended after the age of 50 to the age of 70, colonoscopy every 5 years \par \par F/U in 6 months with full PFTs\par He is encouraged to call with any changes, concerns, or questions.

## 2021-11-03 ENCOUNTER — RX RENEWAL (OUTPATIENT)
Age: 61
End: 2021-11-03

## 2021-11-29 ENCOUNTER — NON-APPOINTMENT (OUTPATIENT)
Age: 61
End: 2021-11-29

## 2021-12-07 RX ORDER — ARMODAFINIL 250 MG/1
250 TABLET ORAL
Qty: 30 | Refills: 5 | Status: ACTIVE | COMMUNITY
Start: 2019-02-21 | End: 1900-01-01

## 2022-01-21 ENCOUNTER — NON-APPOINTMENT (OUTPATIENT)
Age: 62
End: 2022-01-21

## 2022-01-21 ENCOUNTER — APPOINTMENT (OUTPATIENT)
Dept: CARDIOLOGY | Facility: CLINIC | Age: 62
End: 2022-01-21
Payer: COMMERCIAL

## 2022-01-21 VITALS
DIASTOLIC BLOOD PRESSURE: 81 MMHG | HEIGHT: 69 IN | OXYGEN SATURATION: 96 % | BODY MASS INDEX: 32.29 KG/M2 | SYSTOLIC BLOOD PRESSURE: 174 MMHG | HEART RATE: 78 BPM | WEIGHT: 218 LBS

## 2022-01-21 VITALS — DIASTOLIC BLOOD PRESSURE: 70 MMHG | SYSTOLIC BLOOD PRESSURE: 150 MMHG

## 2022-01-21 PROCEDURE — 99214 OFFICE O/P EST MOD 30 MIN: CPT

## 2022-01-21 PROCEDURE — 93000 ELECTROCARDIOGRAM COMPLETE: CPT

## 2022-01-21 RX ORDER — CLOPIDOGREL BISULFATE 75 MG/1
75 TABLET, FILM COATED ORAL
Qty: 90 | Refills: 0 | Status: DISCONTINUED | COMMUNITY
Start: 2020-02-21 | End: 2022-01-21

## 2022-01-21 RX ORDER — VARENICLINE TARTRATE 0.5 (11)-1
0.5 MG X 11 & KIT ORAL
Qty: 53 | Refills: 1 | Status: DISCONTINUED | COMMUNITY
Start: 2020-06-30 | End: 2022-01-21

## 2022-01-21 NOTE — PHYSICAL EXAM
[General Appearance - Well Developed] : well developed [Normal Appearance] : normal appearance [Well Groomed] : well groomed [General Appearance - Well Nourished] : well nourished [No Deformities] : no deformities [General Appearance - In No Acute Distress] : no acute distress [Normal Conjunctiva] : the conjunctiva exhibited no abnormalities [Eyelids - No Xanthelasma] : the eyelids demonstrated no xanthelasmas [Normal Oral Mucosa] : normal oral mucosa [No Oral Pallor] : no oral pallor [No Oral Cyanosis] : no oral cyanosis [Normal Jugular Venous A Waves Present] : normal jugular venous A waves present [Normal Jugular Venous V Waves Present] : normal jugular venous V waves present [No Jugular Venous Youngblood A Waves] : no jugular venous youngblood A waves [Respiration, Rhythm And Depth] : normal respiratory rhythm and effort [Exaggerated Use Of Accessory Muscles For Inspiration] : no accessory muscle use [Auscultation Breath Sounds / Voice Sounds] : lungs were clear to auscultation bilaterally [Abdomen Soft] : soft [Abdomen Tenderness] : non-tender [Abdomen Mass (___ Cm)] : no abdominal mass palpated [Abnormal Walk] : normal gait [Gait - Sufficient For Exercise Testing] : the gait was sufficient for exercise testing [Nail Clubbing] : no clubbing of the fingernails [Cyanosis, Localized] : no localized cyanosis [Petechial Hemorrhages (___cm)] : no petechial hemorrhages [Skin Color & Pigmentation] : normal skin color and pigmentation [] : no rash [No Venous Stasis] : no venous stasis [Skin Lesions] : no skin lesions [No Skin Ulcers] : no skin ulcer [No Xanthoma] : no  xanthoma was observed [Oriented To Time, Place, And Person] : oriented to person, place, and time [Affect] : the affect was normal [Mood] : the mood was normal [No Anxiety] : not feeling anxious [Normal Rate] : normal [Rhythm Regular] : regular [Normal S1] : normal S1 [Normal S2] : normal S2 [No Gallop] : no gallop heard [Left Carotid Bruit] : left carotid bruit heard [2+] : left 2+ [No Pitting Edema] : no pitting edema present [S3] : no S3 [S4] : no S4 [Right Carotid Bruit] : no bruit heard over the right carotid [Right Femoral Bruit] : no bruit heard over the right femoral artery [Left Femoral Bruit] : no bruit heard over the left femoral artery [Bruit] : no bruit heard

## 2022-01-21 NOTE — HISTORY OF PRESENT ILLNESS
[FreeTextEntry1] : Anibal Vega presented to the office today for a cardiovascular evaluation. He was last seen in the office in June, 2020.\par \par He is now 61 years old, with a history of coronary artery disease. In March of 2008, he developed unstable angina. He was transferred to [facility], where he was found to have a 90% stenosis in his proximal RCA, which was treated with a bare-metal stent. Nonobstructive disease was identified within his LAD at that time.  He had another stress test performed February 23, 2015. After concluding the test, he developed ST segment elevations, and was transferred emergently to [facility]. He was found to have an occluded circumflex with insignificant in-stent restenosis within the RCA stent. Moderate disease was found more distally in the RCA, as well as in the LAD.  Primary angioplasty was performed.  A bare-metal stent was implanted because of a history of bright red blood per rectum.  He has a history of smoking. He has a history of obstructive sleep apnea.  He has been diagnosed with COPD, and he has been taking inhalers intermittently.\par \par At the time of the last visit, he was feeling well.\par \par He's been doing reasonably well since his last visit, without any recurrent angina. He is smoking.  He tried Chantix a couple of years ago, and it worked for a time, and he had actually stopped smoking for a week.  Unfortunately he took a number of doses at the same time by accident, and became frightened.   He is very anxious today.  He does not check his blood pressure at home.

## 2022-01-21 NOTE — DISCUSSION/SUMMARY
[FreeTextEntry1] : Mr. Vega has a history of coronary artery disease, dating back to 2008. He has a history of smoking with associated COPD. He presented in 2015 with exertional dyspnea.  He developed a myocardial infarction following the conclusion of exercise on the treadmill. In 2018 he presented with symptoms consistent with unstable angina, and is status post PCI.\par \par His blood pressure is elevated, but improves by the conclusion of the examination.  I would not change his medication, but he will need to check his blood pressure at home over the next few weeks.\par \par He will come next week for blood work.\par \par He will see me in 6 months.

## 2022-02-16 LAB
ALBUMIN SERPL ELPH-MCNC: 4.2 G/DL
ALP BLD-CCNC: 103 U/L
ALT SERPL-CCNC: 23 U/L
ANION GAP SERPL CALC-SCNC: 11 MMOL/L
AST SERPL-CCNC: 22 U/L
BASOPHILS # BLD AUTO: 0.12 K/UL
BASOPHILS NFR BLD AUTO: 1 %
BILIRUB SERPL-MCNC: 0.3 MG/DL
BUN SERPL-MCNC: 18 MG/DL
CALCIUM SERPL-MCNC: 9.7 MG/DL
CHLORIDE SERPL-SCNC: 103 MMOL/L
CHOLEST SERPL-MCNC: 165 MG/DL
CO2 SERPL-SCNC: 26 MMOL/L
CREAT SERPL-MCNC: 1.21 MG/DL
EOSINOPHIL # BLD AUTO: 0.46 K/UL
EOSINOPHIL NFR BLD AUTO: 3.7 %
ESTIMATED AVERAGE GLUCOSE: 134 MG/DL
GLUCOSE SERPL-MCNC: 127 MG/DL
HBA1C MFR BLD HPLC: 6.3 %
HCT VFR BLD CALC: 49.6 %
HDLC SERPL-MCNC: 33 MG/DL
HGB BLD-MCNC: 16.2 G/DL
IMM GRANULOCYTES NFR BLD AUTO: 0.9 %
LDLC SERPL CALC-MCNC: 83 MG/DL
LYMPHOCYTES # BLD AUTO: 4.04 K/UL
LYMPHOCYTES NFR BLD AUTO: 32.7 %
MAN DIFF?: NORMAL
MCHC RBC-ENTMCNC: 30.9 PG
MCHC RBC-ENTMCNC: 32.7 GM/DL
MCV RBC AUTO: 94.7 FL
MONOCYTES # BLD AUTO: 0.71 K/UL
MONOCYTES NFR BLD AUTO: 5.7 %
NEUTROPHILS # BLD AUTO: 6.92 K/UL
NEUTROPHILS NFR BLD AUTO: 56 %
NONHDLC SERPL-MCNC: 132 MG/DL
PLATELET # BLD AUTO: 283 K/UL
POTASSIUM SERPL-SCNC: 4.2 MMOL/L
PROT SERPL-MCNC: 7.4 G/DL
RBC # BLD: 5.24 M/UL
RBC # FLD: 12.7 %
SODIUM SERPL-SCNC: 141 MMOL/L
TRIGL SERPL-MCNC: 246 MG/DL
TSH SERPL-ACNC: 2.9 UIU/ML
WBC # FLD AUTO: 12.36 K/UL

## 2022-02-26 ENCOUNTER — RX RENEWAL (OUTPATIENT)
Age: 62
End: 2022-02-26

## 2022-02-28 ENCOUNTER — RX RENEWAL (OUTPATIENT)
Age: 62
End: 2022-02-28

## 2022-03-22 ENCOUNTER — RX RENEWAL (OUTPATIENT)
Age: 62
End: 2022-03-22

## 2022-04-07 ENCOUNTER — APPOINTMENT (OUTPATIENT)
Dept: PULMONOLOGY | Facility: CLINIC | Age: 62
End: 2022-04-07
Payer: COMMERCIAL

## 2022-04-07 ENCOUNTER — NON-APPOINTMENT (OUTPATIENT)
Age: 62
End: 2022-04-07

## 2022-04-07 VITALS
TEMPERATURE: 97.6 F | HEIGHT: 69 IN | DIASTOLIC BLOOD PRESSURE: 70 MMHG | OXYGEN SATURATION: 96 % | HEART RATE: 74 BPM | SYSTOLIC BLOOD PRESSURE: 140 MMHG | BODY MASS INDEX: 32.58 KG/M2 | RESPIRATION RATE: 16 BRPM | WEIGHT: 220 LBS

## 2022-04-07 PROCEDURE — 95012 NITRIC OXIDE EXP GAS DETER: CPT

## 2022-04-07 PROCEDURE — 94010 BREATHING CAPACITY TEST: CPT

## 2022-04-07 PROCEDURE — 99214 OFFICE O/P EST MOD 30 MIN: CPT | Mod: 25

## 2022-04-07 NOTE — PROCEDURE
[FreeTextEntry1] : PFT - spi reveals mild obstructive dysfunction; FEV1 is 2.6 which is 76% of predicted, normal flow volume loop \par \par FENO was   10; a normal value being less than 25\par Fractional exhaled nitric oxide (FENO) is regarded as a simple, noninvasive method for assessing eosinophilic airway inflammation. Produced by a variety of cells within the lung, nitric oxide (NO) concentrations are generally low in healthy individuals. However, high concentrations of NO appear to be involved in nonspecific host defense mechanisms and chronic inflammatory diseases such as asthma. The American Thoracic Society (ATS) therefore has recommended using FENO to aid in the diagnosis and monitoring of eosinophilic airway inflammation and asthma, and for identifying steroid responsive individuals whose chronic respiratory symptoms may be caused by airway inflammation.

## 2022-04-07 NOTE — ASSESSMENT
[FreeTextEntry1] : Mr. Vega is a 61 years old male with a history of CAD s/p stent #3 11/2018, TIA, COPD, GERD, NAN, obesity, Abnormal CT - still snoring however less shortness of breath. (non-compliant with CPAP) - stress due to Wife's memory - still smoking. - CT ILDz?progressive 0 NC with CT and blood work \par \par SOB is multifactorial due to:\par -obesity\par -COPD/ ILDZ\par -CAD\par -poor breathing mechanics\par \par problem 1: COPD\par -change Anoro at 1 inhalation QD to Stiolto 2 puffs QD \par -COPD is a progressive disease and although it can’t be cured , appropriate management can slow its progression, reduce frequency and severity of exacerbations, and improve symptoms and the patient quality of life. Hospitalizations are the greatest contributor to the total COPD costs and account for up to 87% of total COPD related costs. Exacerbations are the main cause of admissions and subsequently account for the 40-75% of COPD costs. Inhaled maintenance therapy reduces the incidence of exacerbations in patients with stable COPD. Incorrect inhaler use and nonadherence are major obstacles to achieving COPD treatment goals. Many COPD patients have challenges (impaired inhalation, limited dexterity, reduced cognition: that limit their ability to correctly use their COPD treatment devices resulting in reduced symptom control. Of most importance is smoking cessation and early intervention with respiratory illnesses and contemplation for pulmonary rehab to enhance quality of life.\par -Inhaler technique reviewed as well as oral hygiene techniques reviewed with patient. Avoidance of cold air, extremes of temperature, rescue inhaler should be used before exercise. Order of medication reviewed with patient. Recommended use of a cool mist humidifier in the bedroom.\par \par problem 2: CAD s/p stent #3 11/2018\par -continue to follow up with Dr. Conley regularly (recommended)\par \par problem 3: Allergy/ Sinus/ PND\par -Add Olopatadine 0.6% at 1 sniff/nostril BID \par Environmental measures for allergies were encouraged including mattress and pillow covers, air purifier, and environmental controls. \par \par problem 4: obesity\par -Weight loss, exercise, and diet control were discussed and are highly encouraged. Treatment options were given such as, aqua therapy, and contacting a nutritionist. Recommended to use the elliptical, stationary bike, less use of treadmill.  Obesity is associated with worsening asthma, shortness of breath, and potential for cardiac disease, diabetes, and other underlying medical conditions.\par \par problem 5: poor breathing mechanics\par -Proper breathing techniques were reviewed with an emphasis of exhalation. Patient instructed to breath in for 1 second and out for four seconds. Patient was encouraged to not talk while walking.\par \par problem 6: OSAS\par -continue to use the CPAP machine, tolerating it well (D/w patient again especially for BP control)\par -continue to use Provigil 200 mg QAM\par \par -Sleep apnea is associated with adverse clinical consequences which an affect most organ systems.  Cardiovascular disease risk includes arrhythmias, atrial fibrillation, hypertension, coronary artery disease, and stroke. Metabolic disorders include diabetes type 2, non-alcoholic fatty liver disease. Mood disorder especially depression; and cognitive decline especially in the elderly. Associations with  chronic reflux/Borrero’s esophagus some but not all inclusive. \par -Reasons to assess this include arousal consistent with hypopnea; respiratory events most prominent in REM sleep or supine position; therefore sleep staging and body position are important for accurate diagnosis and estimation of AHI.\par \par problem 7: nicotine addiction  (discussed 4/2022) \par -recommended to use Nicotine Control center / Wellbutrin \par -Discussed for five minutes with the patient the risks/associations with continued smoking including COPD, emphysema, shortness of breath, renal cancer, bladder cancer, stroke risk, cardiac disease, etc. Smoking cessation was discussed at length and highly encouraged. Various options to aid cessation was discussed including use of Chantix, Nicotrol, nicotine products, laser therapy, hypnosis, Wellbutrin, etc.\par \par problem 8: lung cancer screening (noncompliance) - continue yearly\par -follow up chest CT in 7/2020  (DUE) (Rescripted 3.22.2021) - HRCT\par Lung cancer screening is recommended for people between the ages of 55 and 80 with prior 30+ pack year smoking histories. There is irrefutable evidence for realization of lung cancer screening based on two large randomized control trials demonstrating relative reduction in lung cancer mortality for patients undergoing low-dose CT scanning. Risks and benefits reviewed with the patient.\par \par Problem 8: Abnormal "ILDz" \par -complete rheumatological blood work - NC\par -Repeat CT 1/2022 - NC\par -If progressive, then complete VATS biopsy \par \par problem 9: health maintenance \par -s/p COVID 19 Vaccine x3\par -recommended yearly flu shot (refused)\par -recommended strep pneumonia vaccines: Prevnar-13 vaccine, followed by Pneumo vaccine 23 one year following\par -recommended early intervention for URIs\par -recommended regular osteoporosis evaluations\par -recommended early dermatological evaluations\par -recommended after the age of 50 to the age of 70, colonoscopy every 5 years \par \par F/U in 6 months with full PFTs\par He is encouraged to call with any changes, concerns, or questions.

## 2022-04-07 NOTE — HISTORY OF PRESENT ILLNESS
[FreeTextEntry1] : Mr. Vega is a 61 year old male with a history of abnormal chest CT, COPD, GERD, nicotine addiction, obesity, NAN, SOB and TIA presenting to the office today for a follow up visit. His chief complaint is\par \par -he notes that he is still smoking\par -he notes that his vision is normal\par -he notes that he is not sleeping well\par -he notes he wakes up every 2-3 hours\par -he notes that his bowels are regular\par -he notes that he experiences hoarseness if he smokes a lot\par -he notes that he smokes 75% of a pack per day\par -he notes that he snores\par -he notes that he has cut carbohydrates from his diet\par -he notes that he does not exercise\par -he notes that he is trying to lose weight but is unable to\par -he notes he gets SOB at times\par -he notes no TIAs or strokes recently\par -\par \par patient denies any headaches, nausea, vomiting, fever, chills, sweats, chest pain, chest pressure, palpitations, coughing, wheezing, fatigue, diarrhea, constipation, dysphagia, myalgias, dizziness, leg swelling, leg pain, itchy eyes, itchy ears, heartburn, reflux or sour taste in the mouth

## 2022-04-07 NOTE — ADDENDUM
[FreeTextEntry1] : Documented by Eric Fuentes acting as a scribe for Dr. Gavino Mora on 04/07/2022 .\par \par All medical record entries made by the Scribe were at my, Dr. Gavino Mora's, direction and personally dictated by me on. I have reviewed the chart and agree that the record accurately reflects my personal performance of the history, physical exam, assessment and plan. I have also personally directed, reviewed, and agree with the discharge instructions.

## 2022-04-07 NOTE — PHYSICAL EXAM
[No Acute Distress] : no acute distress [Well Nourished] : well nourished [No Deformities] : no deformities [Well Developed] : well developed [III] : Mallampati Class: III [TextBox_2] : OW [TextBox_68] : I:E 1:3; Clear

## 2022-04-16 ENCOUNTER — LABORATORY RESULT (OUTPATIENT)
Age: 62
End: 2022-04-16

## 2022-04-17 LAB
ERYTHROCYTE [SEDIMENTATION RATE] IN BLOOD BY WESTERGREN METHOD: 21 MM/HR
RHEUMATOID FACT SER QL: 10 IU/ML

## 2022-04-18 LAB — ACE BLD-CCNC: 21 U/L

## 2022-04-19 LAB
ANCA AB SER-IMP: ABNORMAL
C-ANCA SER-ACNC: NEGATIVE
CCP AB SER IA-ACNC: <8 UNITS
ENA JO1 AB SER IA-ACNC: <0.2 AL
ENA RNP AB SER IA-ACNC: <0.2 AL
ENA RNP AB SER IA-ACNC: <0.2 AL
ENA SCL70 IGG SER IA-ACNC: <0.2 AL
ENA SM AB SER IA-ACNC: <0.2 AL
ENA SS-A AB SER IA-ACNC: 0.3 AL
ENA SS-B AB SER IA-ACNC: <0.2 AL
MITOCHONDRIA AB SER IF-ACNC: NORMAL
P-ANCA TITR SER IF: NEGATIVE
RF+CCP IGG SER-IMP: NEGATIVE

## 2022-04-20 ENCOUNTER — APPOINTMENT (OUTPATIENT)
Dept: CT IMAGING | Facility: IMAGING CENTER | Age: 62
End: 2022-04-20

## 2022-04-21 ENCOUNTER — APPOINTMENT (OUTPATIENT)
Dept: CT IMAGING | Facility: IMAGING CENTER | Age: 62
End: 2022-04-21
Payer: COMMERCIAL

## 2022-04-21 ENCOUNTER — OUTPATIENT (OUTPATIENT)
Dept: OUTPATIENT SERVICES | Facility: HOSPITAL | Age: 62
LOS: 1 days | End: 2022-04-21
Payer: COMMERCIAL

## 2022-04-21 DIAGNOSIS — Z00.8 ENCOUNTER FOR OTHER GENERAL EXAMINATION: ICD-10-CM

## 2022-04-21 DIAGNOSIS — Z95.5 PRESENCE OF CORONARY ANGIOPLASTY IMPLANT AND GRAFT: Chronic | ICD-10-CM

## 2022-04-21 DIAGNOSIS — J84.9 INTERSTITIAL PULMONARY DISEASE, UNSPECIFIED: ICD-10-CM

## 2022-04-21 LAB
ALTERN TENCAPG(M6): <2 MCG/ML
ANA PAT FLD IF-IMP: NORMAL
ANA SER IF-ACNC: ABNORMAL
ASPER FUMCAPG(M3): <2 MCG/ML
AUREOBASCAPG(M12): <2 MCG/ML
MICROPOLYCAPG(M22): <2 MCG/ML
PENIC CHRYCAPG(M1): <2 MCG/ML
PHOMA BETAE IGG: 20.3 MCG/ML
TRICHODERMA VIRIDE IGG: <2 MCG/ML

## 2022-04-21 PROCEDURE — 71250 CT THORAX DX C-: CPT | Mod: 26

## 2022-04-21 PROCEDURE — 71250 CT THORAX DX C-: CPT

## 2022-04-26 LAB — HLA-B27 RELATED AG QL: POSITIVE

## 2022-04-29 ENCOUNTER — NON-APPOINTMENT (OUTPATIENT)
Age: 62
End: 2022-04-29

## 2022-05-09 ENCOUNTER — TRANSCRIPTION ENCOUNTER (OUTPATIENT)
Age: 62
End: 2022-05-09

## 2022-06-03 ENCOUNTER — RX RENEWAL (OUTPATIENT)
Age: 62
End: 2022-06-03

## 2022-06-21 ENCOUNTER — APPOINTMENT (OUTPATIENT)
Dept: RHEUMATOLOGY | Facility: CLINIC | Age: 62
End: 2022-06-21
Payer: COMMERCIAL

## 2022-06-21 VITALS
HEIGHT: 69 IN | TEMPERATURE: 97.7 F | WEIGHT: 223 LBS | HEART RATE: 71 BPM | OXYGEN SATURATION: 95 % | DIASTOLIC BLOOD PRESSURE: 82 MMHG | SYSTOLIC BLOOD PRESSURE: 153 MMHG | BODY MASS INDEX: 33.03 KG/M2

## 2022-06-21 PROCEDURE — 99204 OFFICE O/P NEW MOD 45 MIN: CPT

## 2022-06-24 LAB
ANA PAT FLD IF-IMP: NORMAL
ANA SER IF-ACNC: ABNORMAL
DEPRECATED KAPPA LC FREE/LAMBDA SER: 1.79 RATIO
ENA RNP AB SER IA-ACNC: <0.2 AL
ENA SM AB SER IA-ACNC: <0.2 AL
FERRITIN SERPL-MCNC: 171 NG/ML
IGA SER QL IEP: 349 MG/DL
IGG SER QL IEP: 1608 MG/DL
IGM SER QL IEP: 56 MG/DL
KAPPA LC CSF-MCNC: 3.13 MG/DL
KAPPA LC SERPL-MCNC: 5.6 MG/DL
THYROGLOB AB SERPL-ACNC: <20 IU/ML
THYROPEROXIDASE AB SERPL IA-ACNC: <10 IU/ML

## 2022-07-11 ENCOUNTER — OFFICE (OUTPATIENT)
Dept: URBAN - METROPOLITAN AREA CLINIC 109 | Facility: CLINIC | Age: 62
Setting detail: OPHTHALMOLOGY
End: 2022-07-11
Payer: COMMERCIAL

## 2022-07-11 DIAGNOSIS — H43.391: ICD-10-CM

## 2022-07-11 DIAGNOSIS — B02.33: ICD-10-CM

## 2022-07-11 DIAGNOSIS — H25.13: ICD-10-CM

## 2022-07-11 PROCEDURE — 92004 COMPRE OPH EXAM NEW PT 1/>: CPT | Performed by: OPHTHALMOLOGY

## 2022-07-11 PROCEDURE — 92201 OPSCPY EXTND RTA DRAW UNI/BI: CPT | Performed by: OPHTHALMOLOGY

## 2022-07-11 ASSESSMENT — CONFRONTATIONAL VISUAL FIELD TEST (CVF)
OD_FINDINGS: FULL
OS_FINDINGS: FULL

## 2022-07-11 ASSESSMENT — REFRACTION_CURRENTRX
OD_ADD: +2.50
OD_OVR_VA: 20/
OD_VPRISM_DIRECTION: PROGS
OD_SPHERE: +1.75
OS_SPHERE: +1.50
OS_ADD: +2.50
OS_VPRISM_DIRECTION: PROGS
OD_CYLINDER: -0.25
OD_AXIS: 177
OS_OVR_VA: 20/

## 2022-07-11 ASSESSMENT — TONOMETRY
OS_IOP_MMHG: 18
OD_IOP_MMHG: 19

## 2022-07-11 ASSESSMENT — VISUAL ACUITY
OD_BCVA: 20/20-2
OS_BCVA: 20/20-1

## 2022-07-12 ENCOUNTER — RX RENEWAL (OUTPATIENT)
Age: 62
End: 2022-07-12

## 2022-07-14 ENCOUNTER — OFFICE (OUTPATIENT)
Dept: URBAN - METROPOLITAN AREA CLINIC 109 | Facility: CLINIC | Age: 62
Setting detail: OPHTHALMOLOGY
End: 2022-07-14
Payer: COMMERCIAL

## 2022-07-14 DIAGNOSIS — B02.33: ICD-10-CM

## 2022-07-14 PROBLEM — H43.391 VITREOUS FLOATERS; RIGHT EYE: Status: ACTIVE | Noted: 2022-07-11

## 2022-07-14 PROBLEM — H25.13 CATARACT NUCLEAR SCLEROSIS AGE RELATED; BOTH EYES: Status: ACTIVE | Noted: 2022-07-11

## 2022-07-14 PROCEDURE — 99213 OFFICE O/P EST LOW 20 MIN: CPT | Performed by: OPHTHALMOLOGY

## 2022-07-14 ASSESSMENT — REFRACTION_CURRENTRX
OD_AXIS: 177
OD_ADD: +2.50
OS_SPHERE: +1.50
OD_CYLINDER: -0.25
OD_SPHERE: +1.75
OD_VPRISM_DIRECTION: PROGS
OD_OVR_VA: 20/
OS_OVR_VA: 20/
OS_VPRISM_DIRECTION: PROGS
OS_ADD: +2.50

## 2022-07-14 ASSESSMENT — TONOMETRY: OD_IOP_MMHG: 19

## 2022-07-14 ASSESSMENT — VISUAL ACUITY
OD_BCVA: 20/20
OS_BCVA: 20/20

## 2022-07-14 ASSESSMENT — CONFRONTATIONAL VISUAL FIELD TEST (CVF)
OD_FINDINGS: FULL
OS_FINDINGS: FULL

## 2022-07-26 LAB
EJ AB SER QL: NEGATIVE
ENA JO1 AB SER IA-ACNC: <20 UNITS
ENA PM/SCL AB SER-ACNC: <20 UNITS
ENA SM+RNP AB SER IA-ACNC: <20 UNITS
ENA SS-A IGG SER QL: <20 UNITS
FIBRILLARIN AB SER QL: NEGATIVE
KU AB SER QL: NEGATIVE
MDA-5 (P140)(CADM-140): <20 UNITS
MI2 AB SER QL: NEGATIVE
MYELOPEROXIDASE AB SER QL IA: 6.4 UNITS
MYELOPEROXIDASE CELLS FLD QL: NEGATIVE
NXP-2 (P140): <20 UNITS
OJ AB SER QL: NEGATIVE
PL12 AB SER QL: NEGATIVE
PL7 AB SER QL: NEGATIVE
PROTEINASE3 AB SER IA-ACNC: 45.1 UNITS
PROTEINASE3 AB SER-ACNC: POSITIVE
SRP AB SERPL QL: NEGATIVE
TIF GAMMA (P155/140): <20 UNITS
U2 SNRNP AB SER QL: NEGATIVE

## 2022-08-03 ENCOUNTER — NON-APPOINTMENT (OUTPATIENT)
Age: 62
End: 2022-08-03

## 2022-08-03 ENCOUNTER — APPOINTMENT (OUTPATIENT)
Dept: CARDIOLOGY | Facility: CLINIC | Age: 62
End: 2022-08-03

## 2022-08-03 VITALS — DIASTOLIC BLOOD PRESSURE: 70 MMHG | SYSTOLIC BLOOD PRESSURE: 140 MMHG

## 2022-08-03 VITALS
OXYGEN SATURATION: 96 % | DIASTOLIC BLOOD PRESSURE: 88 MMHG | HEART RATE: 69 BPM | WEIGHT: 216 LBS | SYSTOLIC BLOOD PRESSURE: 145 MMHG | HEIGHT: 69 IN | BODY MASS INDEX: 31.99 KG/M2

## 2022-08-03 DIAGNOSIS — I10 ESSENTIAL (PRIMARY) HYPERTENSION: ICD-10-CM

## 2022-08-03 PROCEDURE — 93000 ELECTROCARDIOGRAM COMPLETE: CPT

## 2022-08-03 PROCEDURE — 99214 OFFICE O/P EST MOD 30 MIN: CPT | Mod: 25

## 2022-08-03 RX ORDER — ERYTHROMYCIN 5 MG/G
5 OINTMENT OPHTHALMIC
Qty: 4 | Refills: 0 | Status: ACTIVE | COMMUNITY
Start: 2022-07-11

## 2022-08-03 RX ORDER — LIDOCAINE AND PRILOCAINE 25; 25 MG/G; MG/G
2.5-2.5 CREAM TOPICAL
Qty: 30 | Refills: 0 | Status: ACTIVE | COMMUNITY
Start: 2022-07-11

## 2022-08-03 RX ORDER — VALACYCLOVIR 1 G/1
1 TABLET, FILM COATED ORAL
Qty: 21 | Refills: 0 | Status: ACTIVE | COMMUNITY
Start: 2022-07-11

## 2022-08-03 RX ORDER — TOBRAMYCIN AND DEXAMETHASONE 3; 1 MG/ML; MG/ML
0.3-0.1 SUSPENSION/ DROPS OPHTHALMIC
Qty: 5 | Refills: 0 | Status: ACTIVE | COMMUNITY
Start: 2022-07-11

## 2022-08-03 NOTE — PHYSICAL EXAM
[General Appearance - Well Developed] : well developed [Normal Appearance] : normal appearance [Well Groomed] : well groomed [General Appearance - Well Nourished] : well nourished [No Deformities] : no deformities [General Appearance - In No Acute Distress] : no acute distress [Eyelids - No Xanthelasma] : the eyelids demonstrated no xanthelasmas [Normal Oral Mucosa] : normal oral mucosa [No Oral Pallor] : no oral pallor [No Oral Cyanosis] : no oral cyanosis [Normal Jugular Venous A Waves Present] : normal jugular venous A waves present [Normal Jugular Venous V Waves Present] : normal jugular venous V waves present [No Jugular Venous Youngblood A Waves] : no jugular venous youngblood A waves [Respiration, Rhythm And Depth] : normal respiratory rhythm and effort [Exaggerated Use Of Accessory Muscles For Inspiration] : no accessory muscle use [Auscultation Breath Sounds / Voice Sounds] : lungs were clear to auscultation bilaterally [Abdomen Soft] : soft [Abdomen Tenderness] : non-tender [Abdomen Mass (___ Cm)] : no abdominal mass palpated [Abnormal Walk] : normal gait [Gait - Sufficient For Exercise Testing] : the gait was sufficient for exercise testing [Nail Clubbing] : no clubbing of the fingernails [Cyanosis, Localized] : no localized cyanosis [Petechial Hemorrhages (___cm)] : no petechial hemorrhages [Skin Color & Pigmentation] : normal skin color and pigmentation [] : no rash [No Venous Stasis] : no venous stasis [Skin Lesions] : no skin lesions [No Skin Ulcers] : no skin ulcer [No Xanthoma] : no  xanthoma was observed [Oriented To Time, Place, And Person] : oriented to person, place, and time [Affect] : the affect was normal [Mood] : the mood was normal [No Anxiety] : not feeling anxious [Normal Rate] : normal [2+] : left 2+ [Well Developed] : well developed [Well Nourished] : well nourished [No Acute Distress] : no acute distress [Normal Conjunctiva] : normal conjunctiva [Normal Venous Pressure] : normal venous pressure [No Carotid Bruit] : no carotid bruit [Normal S1, S2] : normal S1, S2 [No Murmur] : no murmur [No Rub] : no rub [No Gallop] : no gallop [Rhythm Regular] : regular [Normal S1] : normal S1 [Normal S2] : normal S2 [No Pitting Edema] : no pitting edema present [Clear Lung Fields] : clear lung fields [Good Air Entry] : good air entry [No Respiratory Distress] : no respiratory distress  [Soft] : abdomen soft [Non Tender] : non-tender [No Masses/organomegaly] : no masses/organomegaly [Normal Bowel Sounds] : normal bowel sounds [Normal Gait] : normal gait [No Edema] : no edema [No Cyanosis] : no cyanosis [No Clubbing] : no clubbing [No Varicosities] : no varicosities [No Rash] : no rash [No Skin Lesions] : no skin lesions [Moves all extremities] : moves all extremities [No Focal Deficits] : no focal deficits [Normal Speech] : normal speech [Alert and Oriented] : alert and oriented [Normal memory] : normal memory [Left Carotid Bruit] : no bruit heard over the left carotid [S3] : no S3 [S4] : no S4 [Right Carotid Bruit] : no bruit heard over the right carotid [Right Femoral Bruit] : no bruit heard over the right femoral artery [Left Femoral Bruit] : no bruit heard over the left femoral artery [Bruit] : no bruit heard

## 2022-08-03 NOTE — DISCUSSION/SUMMARY
[FreeTextEntry1] : Mr. Vega has a history of coronary artery disease, dating back to 2008. He has a history of smoking with associated COPD. He presented in 2015 with exertional dyspnea.  He developed a myocardial infarction following the conclusion of exercise on the treadmill. In 2018 he presented with symptoms consistent with unstable angina, and is status post PCI to RCA.\par \par He presents today in no acute distress.  Euvolemic on exam.  ECG illustrates normal sinus, LBBB.  \par His blood pressure is elevated, and is still a bit elevated at the conclusion of the examination. I have advised that he increase lisinopril to 20mg daily.  He will check his blood pressures at home, while on the increased dose. He will continue metoprolol 25mg BID.\par NO need for ischemic work up at this time. He presents with no signs of angina, HF or unstable arrhythmia. I  Will  however, send him for a 2d echo to assess for any new structural heart disease, changes in valvular and ventricular function.\par \par For hx of obstructive ASHD and in the setting of smoking, he will continue dual antiplatelet therapy with plavix 75 and asa 81.  He will continue atorvastatin 80mg daily for goal LDL <100, ideally <70.\par \par He will see me in 6 months.

## 2022-08-03 NOTE — ADDENDUM
[FreeTextEntry1] : His blood pressure remains elevated, and I will increase lisinopril.  He has a known cardiomyopathy, and we will recheck his ejection fraction with an echocardiogram, not performed for the last 2 years.

## 2022-08-03 NOTE — CARDIOLOGY SUMMARY
[___] : [unfilled] [de-identified] : LBBB [de-identified] : 2020\par pharm\par large fixed inf /inf lateral wall infarct\par hypokinesis  [de-identified] : 2020\par EF 50-55%\par inf wall hypokinesis\par mild diastolic dysfunction [de-identified] : 2008 , \par 2015 BMS \par 2018 [facility] RCA

## 2022-08-03 NOTE — HISTORY OF PRESENT ILLNESS
[FreeTextEntry1] : Anibal Vega presented to the office today for a cardiovascular evaluation. He was last seen in the office in June, 2020.\par \par He is now 61 years old, with a history of coronary artery disease. In March of 2008, he developed unstable angina. He was transferred to [facility], where he was found to have a 90% stenosis in his proximal RCA, which was treated with a bare-metal stent. Nonobstructive disease was identified within his LAD at that time.  He had another stress test performed February 23, 2015. After concluding the test, he developed ST segment elevations, and was transferred emergently to [facility]. He was found to have an occluded circumflex with insignificant in-stent restenosis within the RCA stent. Moderate disease was found more distally in the RCA, as well as in the LAD.  Primary angioplasty was performed.  A bare-metal stent was implanted because of a history of bright red blood per rectum.  He has a history of smoking. He has a history of obstructive sleep apnea.  He has been diagnosed with COPD, and he has been taking inhalers intermittently.\par \par At the time of the last visit, he was feeling well.\par \par He's been doing reasonably well since his last visit, without any recurrent angina. He is smoking.  He tried Chantix a couple of years ago, and it worked for a time, and he had actually stopped smoking for a week.  Unfortunately he took a number of doses at the same time by accident, and became frightened.   He is very anxious today.  He does not check his blood pressure at home.\par \par \par Previous visit history as above:\par Mr Vega presents today 8/3/22 for follow up visit.\par Had shingles few weeks back, he s/p therapy, oral , cream and eye gtts.  He is recovering well.  Lesions have crusted over. \par He feels well.  in a [facility] daily.  He is able to engage in every day work requirements without trouble. \par He denies chest pains or pressure. He  denies dizzy spells, feeling faint or fainting, He   denies palpitations or difficulty breathing while laying flat. He denies lower extremity swelling.\par \par He is still smoking, He is under a bit of stress given his wife's health. She has history of brain aneurysm-s/p coiling but had not recovered well.  She is under home palliative care.\par

## 2022-08-10 ENCOUNTER — APPOINTMENT (OUTPATIENT)
Dept: PULMONOLOGY | Facility: CLINIC | Age: 62
End: 2022-08-10

## 2022-08-10 ENCOUNTER — APPOINTMENT (OUTPATIENT)
Dept: CARDIOLOGY | Facility: CLINIC | Age: 62
End: 2022-08-10

## 2022-08-10 VITALS
BODY MASS INDEX: 30.78 KG/M2 | OXYGEN SATURATION: 94 % | DIASTOLIC BLOOD PRESSURE: 80 MMHG | HEIGHT: 70 IN | SYSTOLIC BLOOD PRESSURE: 130 MMHG | RESPIRATION RATE: 16 BRPM | WEIGHT: 215 LBS | TEMPERATURE: 97.9 F | HEART RATE: 70 BPM

## 2022-08-10 PROCEDURE — 94729 DIFFUSING CAPACITY: CPT

## 2022-08-10 PROCEDURE — 93306 TTE W/DOPPLER COMPLETE: CPT

## 2022-08-10 PROCEDURE — 94010 BREATHING CAPACITY TEST: CPT

## 2022-08-10 PROCEDURE — 95012 NITRIC OXIDE EXP GAS DETER: CPT

## 2022-08-10 PROCEDURE — 99214 OFFICE O/P EST MOD 30 MIN: CPT | Mod: 25

## 2022-08-10 PROCEDURE — 94727 GAS DIL/WSHOT DETER LNG VOL: CPT

## 2022-08-10 NOTE — HISTORY OF PRESENT ILLNESS
[FreeTextEntry1] : Mr. Vega is a 62 year old male with a history of abnormal chest CT, COPD, GERD, nicotine addiction, obesity, NAN, SOB and TIA presenting to the office today for a follow up visit. His chief complaint is\par - s/p facial shingles on the right\par - he notes feeling well in general\par - he denies any swallowing issues\par - he notes losing 4-5 lbs\par - he notes still smoking about half to three quarters\par - he notes SOB \par - he notes being more breathless \par \par \par - He denies any headaches, nausea, vomiting, fever, chills, sweats, chest pain, chest pressure, palpitations, coughing, fatigue, diarrhea, constipation, dysphagia, myalgias, dizziness, leg swelling, leg pain, itchy eyes, itchy ears, heartburn, reflux, or sour taste in the mouth

## 2022-08-10 NOTE — ASSESSMENT
[FreeTextEntry1] : Mr. Vega is a 62 years old male with a history of CAD s/p stent #3 11/2018, TIA, COPD, GERD, NAN, obesity, Abnormal CT - still snoring however less shortness of breath. (non-compliant with CPAP) - stress due to Wife's memory - still smoking. - CT ILDz?- stable\par \par SOB is multifactorial due to:\par -obesity\par -COPD/ ILDZ\par -CAD\par -poor breathing mechanics\par \par problem 1: COPD\par - add breztri 2 BID\par -COPD is a progressive disease and although it can’t be cured , appropriate management can slow its progression, reduce frequency and severity of exacerbations, and improve symptoms and the patient quality of life. Hospitalizations are the greatest contributor to the total COPD costs and account for up to 87% of total COPD related costs. Exacerbations are the main cause of admissions and subsequently account for the 40-75% of COPD costs. Inhaled maintenance therapy reduces the incidence of exacerbations in patients with stable COPD. Incorrect inhaler use and nonadherence are major obstacles to achieving COPD treatment goals. Many COPD patients have challenges (impaired inhalation, limited dexterity, reduced cognition: that limit their ability to correctly use their COPD treatment devices resulting in reduced symptom control. Of most importance is smoking cessation and early intervention with respiratory illnesses and contemplation for pulmonary rehab to enhance quality of life.\par -Inhaler technique reviewed as well as oral hygiene techniques reviewed with patient. Avoidance of cold air, extremes of temperature, rescue inhaler should be used before exercise. Order of medication reviewed with patient. Recommended use of a cool mist humidifier in the bedroom.\par \par problem 2: CAD s/p stent #3 11/2018\par -continue to follow up with Dr. Conley regularly (recommended)\par \par problem 3: Allergy/ Sinus/ PND\par -Add Olopatadine 0.6% at 1 sniff/nostril BID \par Environmental measures for allergies were encouraged including mattress and pillow covers, air purifier, and environmental controls. \par \par problem 4: obesity\par -Weight loss, exercise, and diet control were discussed and are highly encouraged. Treatment options were given such as, aqua therapy, and contacting a nutritionist. Recommended to use the elliptical, stationary bike, less use of treadmill.  Obesity is associated with worsening asthma, shortness of breath, and potential for cardiac disease, diabetes, and other underlying medical conditions.\par \par problem 5: poor breathing mechanics\par -Proper breathing techniques were reviewed with an emphasis of exhalation. Patient instructed to breath in for 1 second and out for four seconds. Patient was encouraged to not talk while walking.\par \par problem 6: OSAS\par -continue to use the CPAP machine, tolerating it well (D/w patient again especially for BP control)\par -continue to use Provigil 200 mg QAM\par \par -Sleep apnea is associated with adverse clinical consequences which an affect most organ systems.  Cardiovascular disease risk includes arrhythmias, atrial fibrillation, hypertension, coronary artery disease, and stroke. Metabolic disorders include diabetes type 2, non-alcoholic fatty liver disease. Mood disorder especially depression; and cognitive decline especially in the elderly. Associations with  chronic reflux/Borrero’s esophagus some but not all inclusive. \par -Reasons to assess this include arousal consistent with hypopnea; respiratory events most prominent in REM sleep or supine position; therefore sleep staging and body position are important for accurate diagnosis and estimation of AHI.\par \par problem 7: nicotine addiction  (discussed 8/2022) \par -recommended to use Nicotine Control center / Wellbutrin \par -Discussed for five minutes with the patient the risks/associations with continued smoking including COPD, emphysema, shortness of breath, renal cancer, bladder cancer, stroke risk, cardiac disease, etc. Smoking cessation was discussed at length and highly encouraged. Various options to aid cessation was discussed including use of Chantix, Nicotrol, nicotine products, laser therapy, hypnosis, Wellbutrin, etc.\par \par problem 8: lung cancer screening (noncompliance) - continue yearly- stable\par -follow up chest CT in 5/2023\par Lung cancer screening is recommended for people between the ages of 55 and 80 with prior 30+ pack year smoking histories. There is irrefutable evidence for realization of lung cancer screening based on two large randomized control trials demonstrating relative reduction in lung cancer mortality for patients undergoing low-dose CT scanning. Risks and benefits reviewed with the patient.\par \par Problem 8: Abnormal "ILDz" \par -complete rheumatological blood work - NC\par -Repeat CT 5/2023\par -If progressive, then complete VATS biopsy \par \par problem 9: health maintenance \par -s/p COVID 19 Vaccine x3\par -recommended yearly flu shot (refused)\par -recommended strep pneumonia vaccines: Prevnar-13 vaccine, followed by Pneumo vaccine 23 one year following\par -recommended early intervention for URIs\par -recommended regular osteoporosis evaluations\par -recommended early dermatological evaluations\par -recommended after the age of 50 to the age of 70, colonoscopy every 5 years \par \par F/U in 6 months with full PFTs\par He is encouraged to call with any changes, concerns, or questions.

## 2022-08-10 NOTE — PROCEDURE
[FreeTextEntry1] : FENO was 15; a normal value being less than 25\par Fractional exhaled nitric oxide (FENO) is regarded as a simple, noninvasive method for assessing eosinophilic airway inflammation. Produced by a variety of cells within the lung, nitric oxide (NO) concentrations are generally low in healthy individuals. However, high concentrations of NO appear to be involved in nonspecific host defense mechanisms and chronic inflammatory diseases such as asthma. The American Thoracic Society (ATS) therefore has recommended using FENO to aid in the diagnosis and monitoring of eosinophilic airway inflammation and asthma, and for identifying steroid responsive individuals whose chronic respiratory symptoms may be caused by airway inflammation. \par \par Full PFT- spi reveals mild-moderate obstruction; FEV1 was 2.48L which is 71% of predicted; mild low lung volumes; mild low diffusion at 16.0, which is 64% of predicted; normal flow volume loop \par \par CT (04/21/2022) revealed Stable overall appearance of centrilobular emphysema and groundglass opacities since 8/2/21. No honeycombing is noted.\par

## 2022-08-10 NOTE — ADDENDUM
[FreeTextEntry1] : Documented by Elio David acting as a scribe for Dr. Gavino Mora on (08/10/2022).\par \par All medical record record entries made by the Scribe were at my, Dr. Gavino Mora's, direction and personally dictated by me on (08/10/2022). I have reviewed the chart and agree that the record accurately reflects my personal performance of the history, physical exam, assessment and plan. I have personally directed, reviewed, and agree with the discharge instructions.

## 2022-08-12 ENCOUNTER — RX RENEWAL (OUTPATIENT)
Age: 62
End: 2022-08-12

## 2022-08-12 RX ORDER — TIOTROPIUM BROMIDE AND OLODATEROL 3.124; 2.736 UG/1; UG/1
2.5-2.5 SPRAY, METERED RESPIRATORY (INHALATION) DAILY
Qty: 3 | Refills: 1 | Status: ACTIVE | COMMUNITY
Start: 2022-04-07 | End: 1900-01-01

## 2022-09-20 ENCOUNTER — TRANSCRIPTION ENCOUNTER (OUTPATIENT)
Age: 62
End: 2022-09-20

## 2022-11-10 NOTE — H&P CARDIOLOGY - AS BP NONINV METHOD
electronic Mastoid Interpolation Flap Division And Inset Text: Division and inset of the mastoid interpolation flap was performed to achieve optimal aesthetic result, restore normal anatomic appearance and avoid distortion of normal anatomy, expedite and facilitate wound healing, achieve optimal functional result and because linear closure either not possible or would produce suboptimal result. The patient was prepped and draped in the usual manner. The pedicle was infiltrated with local anesthesia. The pedicle was sectioned with a #15 blade. The pedicle was de-bulked and trimmed to match the shape of the defect. Hemostasis was achieved. The flap donor site and free margin of the flap were secured with deep buried sutures and the wound edges were re-approximated.

## 2022-12-02 ENCOUNTER — RX RENEWAL (OUTPATIENT)
Age: 62
End: 2022-12-02

## 2022-12-07 ENCOUNTER — APPOINTMENT (OUTPATIENT)
Dept: PULMONOLOGY | Facility: CLINIC | Age: 62
End: 2022-12-07

## 2022-12-07 ENCOUNTER — NON-APPOINTMENT (OUTPATIENT)
Age: 62
End: 2022-12-07

## 2022-12-07 VITALS
WEIGHT: 210 LBS | OXYGEN SATURATION: 96 % | RESPIRATION RATE: 16 BRPM | SYSTOLIC BLOOD PRESSURE: 140 MMHG | TEMPERATURE: 96.2 F | HEART RATE: 69 BPM | DIASTOLIC BLOOD PRESSURE: 80 MMHG | HEIGHT: 70 IN | BODY MASS INDEX: 30.06 KG/M2

## 2022-12-07 DIAGNOSIS — Z23 ENCOUNTER FOR IMMUNIZATION: ICD-10-CM

## 2022-12-07 PROCEDURE — 94010 BREATHING CAPACITY TEST: CPT

## 2022-12-07 PROCEDURE — 95012 NITRIC OXIDE EXP GAS DETER: CPT

## 2022-12-07 PROCEDURE — G0009: CPT

## 2022-12-07 PROCEDURE — 90677 PCV20 VACCINE IM: CPT

## 2022-12-07 PROCEDURE — 99214 OFFICE O/P EST MOD 30 MIN: CPT | Mod: 25

## 2022-12-07 NOTE — PHYSICAL EXAM
[No Acute Distress] : no acute distress [III] : Mallampati Class: III [Normal Oropharynx] : normal oropharynx [Normal Appearance] : normal appearance [No Neck Mass] : no neck mass [Normal Rate/Rhythm] : normal rate/rhythm [Normal S1, S2] : normal s1, s2 [No Murmurs] : no murmurs [No Resp Distress] : no resp distress [Clear to Auscultation Bilaterally] : clear to auscultation bilaterally [No Abnormalities] : no abnormalities [Benign] : benign [Normal Gait] : normal gait [No Clubbing] : no clubbing [No Cyanosis] : no cyanosis [No Edema] : no edema [FROM] : FROM [Normal Color/ Pigmentation] : normal color/ pigmentation [No Focal Deficits] : no focal deficits [Oriented x3] : oriented x3 [Normal Affect] : normal affect [TextBox_2] : OW [TextBox_68] : I:E 1:3; Clear

## 2022-12-07 NOTE — PROCEDURE
[FreeTextEntry1] : PFT reveals normal flows, with an FEV1 of  2.74L, which is 81% of predicted, with a normal flow volume loop. \par \par FENO was 31; a normal value being less than 25\par Fractional exhaled nitric oxide (FENO) is regarded as a simple, noninvasive method for assessing eosinophilic airway inflammation. Produced by a variety of cells within the lung, nitric oxide (NO) concentrations are generally low in healthy individuals. However, high concentrations of NO appear to be involved in nonspecific host defense mechanisms and chronic inflammatory diseases such as asthma. The American Thoracic Society (ATS) therefore has recommended using FENO to aid in the diagnosis and monitoring of eosinophilic airway inflammation and asthma, and for identifying steroid responsive individuals whose chronic respiratory symptoms may be caused by airway inflammation.

## 2022-12-07 NOTE — HISTORY OF PRESENT ILLNESS
[FreeTextEntry1] : Mr. Vega is a 62 year old male with a history of abnormal chest CT, COPD, GERD, nicotine addiction, obesity, NAN, SOB and TIA presenting to the office today for a follow up visit. His chief complaint is\par \par -he notes continuing to smoke 3/4 PPD\par -he notes energy levels are 8/10\par -he notes vision is stable \par -he notes interrupted sleep\par -he notes sinuses are quiet with Rx\par -he denies any dyspnea with inhaler use\par -he denies taking any new medications, vitamins, or supplements \par \par -he denies any headaches, nausea, vomiting, fever, chills, sweats, chest pain, chest pressure, coughing, wheezing, palpitations, constipation, diarrhea, dizziness, dysphagia, heartburn, reflux, itchy eyes, itchy ears, leg swelling, leg pain, arthralgias, myalgias, or sour taste in the mouth.

## 2022-12-07 NOTE — ADDENDUM
[FreeTextEntry1] : Documented by RAAD Lerner acting as a scribe for Dr. Gavino Mora on 12/07/2022 .\par \par All medical record entries made by the Scribe were at my, Dr. Gavino Mora's, direction and personally dictated by me on 12/07/2022. I have reviewed the chart and agree that the record accurately reflects my personal performance of the history, physical exam, assessment and plan. I have also personally directed, reviewed, and agree with the discharge instructions.

## 2022-12-07 NOTE — ASSESSMENT
[FreeTextEntry1] : Mr. Vega is a 62 years old male with a history of CAD s/p stent #3 11/2018, TIA, COPD, GERD, NAN, obesity, Abnormal CT - still snoring however less shortness of breath. (non-compliant with CPAP) - stress due to Wife's memory - still smoking. - CT ILDz?- stable- excpet addicted to nicotine\par \par SOB is multifactorial due to:\par -obesity\par -COPD/ ILDZ\par -CAD\par -poor breathing mechanics\par \par problem 1: COPD- quiet\par - add breztri 2 BID\par -COPD is a progressive disease and although it can’t be cured , appropriate management can slow its progression, reduce frequency and severity of exacerbations, and improve symptoms and the patient quality of life. Hospitalizations are the greatest contributor to the total COPD costs and account for up to 87% of total COPD related costs. Exacerbations are the main cause of admissions and subsequently account for the 40-75% of COPD costs. Inhaled maintenance therapy reduces the incidence of exacerbations in patients with stable COPD. Incorrect inhaler use and nonadherence are major obstacles to achieving COPD treatment goals. Many COPD patients have challenges (impaired inhalation, limited dexterity, reduced cognition: that limit their ability to correctly use their COPD treatment devices resulting in reduced symptom control. Of most importance is smoking cessation and early intervention with respiratory illnesses and contemplation for pulmonary rehab to enhance quality of life.\par -Inhaler technique reviewed as well as oral hygiene techniques reviewed with patient. Avoidance of cold air, extremes of temperature, rescue inhaler should be used before exercise. Order of medication reviewed with patient. Recommended use of a cool mist humidifier in the bedroom.\par \par problem 2: CAD s/p stent #3 11/2018\par -continue to follow up with Dr. Conley regularly (recommended)\par \par problem 3: Allergy/ Sinus/ PND\par -Add Olopatadine 0.6% at 1 sniff/nostril BID \par Environmental measures for allergies were encouraged including mattress and pillow covers, air purifier, and environmental controls. \par \par problem 4: obesity\par -Weight loss, exercise, and diet control were discussed and are highly encouraged. Treatment options were given such as, aqua therapy, and contacting a nutritionist. Recommended to use the elliptical, stationary bike, less use of treadmill.  Obesity is associated with worsening asthma, shortness of breath, and potential for cardiac disease, diabetes, and other underlying medical conditions.\par \par problem 5: poor breathing mechanics\par -Proper breathing techniques were reviewed with an emphasis of exhalation. Patient instructed to breath in for 1 second and out for four seconds. Patient was encouraged to not talk while walking.\par \par problem 6: OSAS\par -continue to use the CPAP machine, tolerating it well (D/w patient again especially for BP control)\par -continue to use Provigil 200 mg QAM\par \par -Sleep apnea is associated with adverse clinical consequences which an affect most organ systems.  Cardiovascular disease risk includes arrhythmias, atrial fibrillation, hypertension, coronary artery disease, and stroke. Metabolic disorders include diabetes type 2, non-alcoholic fatty liver disease. Mood disorder especially depression; and cognitive decline especially in the elderly. Associations with  chronic reflux/Borrero’s esophagus some but not all inclusive. \par -Reasons to assess this include arousal consistent with hypopnea; respiratory events most prominent in REM sleep or supine position; therefore sleep staging and body position are important for accurate diagnosis and estimation of AHI.\par \par problem 7: nicotine addiction  (discussed 12/7/2022)\par -recommended to use Nicotine Control center / Wellbutrin \par -Discussed for five minutes with the patient the risks/associations with continued smoking including COPD, emphysema, shortness of breath, renal cancer, bladder cancer, stroke risk, cardiac disease, etc. Smoking cessation was discussed at length and highly encouraged. Various options to aid cessation was discussed including use of Chantix, Nicotrol, nicotine products, laser therapy, hypnosis, Wellbutrin, etc.\par \par problem 8: lung cancer screening (noncompliance) - continue yearly- stable\par -follow up chest CT in 5/2023\par Lung cancer screening is recommended for people between the ages of 55 and 80 with prior 30+ pack year smoking histories. There is irrefutable evidence for realization of lung cancer screening based on two large randomized control trials demonstrating relative reduction in lung cancer mortality for patients undergoing low-dose CT scanning. Risks and benefits reviewed with the patient.\par \par Problem 8: Abnormal "ILDz" \par -complete rheumatological blood work - NC\par -Repeat CT 5/2023\par -If progressive, then complete VATS biopsy \par \par problem 9: health maintenance \par -s/p COVID 19 Vaccine x3\par -recommended yearly flu shot (refused)\par -recommended strep pneumonia vaccines: Prevnar-20 vaccine (12/7/2022), followed by Pneumo vaccine 23 one year following\par -recommended early intervention for URIs\par -recommended regular osteoporosis evaluations\par -recommended early dermatological evaluations\par -recommended after the age of 50 to the age of 70, colonoscopy every 5 years \par \par F/U in 6 months with full PFTs\par He is encouraged to call with any changes, concerns, or questions.

## 2022-12-08 PROBLEM — Z23 ENCOUNTER FOR IMMUNIZATION: Status: ACTIVE | Noted: 2022-12-08

## 2023-01-06 ENCOUNTER — RX RENEWAL (OUTPATIENT)
Age: 63
End: 2023-01-06

## 2023-01-10 ENCOUNTER — RX RENEWAL (OUTPATIENT)
Age: 63
End: 2023-01-10

## 2023-01-11 ENCOUNTER — RX RENEWAL (OUTPATIENT)
Age: 63
End: 2023-01-11

## 2023-01-26 ENCOUNTER — NON-APPOINTMENT (OUTPATIENT)
Age: 63
End: 2023-01-26

## 2023-01-26 ENCOUNTER — APPOINTMENT (OUTPATIENT)
Dept: CARDIOLOGY | Facility: CLINIC | Age: 63
End: 2023-01-26
Payer: COMMERCIAL

## 2023-01-26 VITALS
HEIGHT: 70 IN | SYSTOLIC BLOOD PRESSURE: 170 MMHG | HEART RATE: 78 BPM | WEIGHT: 212 LBS | BODY MASS INDEX: 30.35 KG/M2 | OXYGEN SATURATION: 95 % | DIASTOLIC BLOOD PRESSURE: 81 MMHG

## 2023-01-26 PROCEDURE — 93000 ELECTROCARDIOGRAM COMPLETE: CPT

## 2023-01-26 PROCEDURE — 99214 OFFICE O/P EST MOD 30 MIN: CPT | Mod: 25

## 2023-01-26 NOTE — DISCUSSION/SUMMARY
[FreeTextEntry1] : Mr. Vega has a history of coronary artery disease, dating back to 2008. He has a history of smoking with associated COPD. He presented in 2015 with exertional dyspnea.  He developed a myocardial infarction following the conclusion of exercise on the treadmill. In 2018 he presented with symptoms consistent with unstable angina, and is status post PCI to RCA.\par \par He presents today in no acute distress.  He remains euvolemic on exam.  ECG illustrates normal sinus, with a left bundle branch block, unchanged from prior.\par His blood pressure is elevated, and is still elevated at the conclusion of the examination.  For now, we will continue metoprolol and lisinopril.  I have asked that he start checking his blood pressure at home regularly, and send me a list of blood pressure readings in about 2 weeks.\par \par I reviewed his most recent blood work results, including his most recent lipid profile.  I have suggested repeating his blood work.  I reviewed his most recent echocardiogram from August 2022.  This revealed a normal ejection fraction with mild mitral regurgitation and age-appropriate valvular calcification.  I reviewed his most recent nuclear stress test from August 2020, and his most recent coronary angiography from November  \par \par He will see me in 6 months.

## 2023-01-26 NOTE — HISTORY OF PRESENT ILLNESS
[FreeTextEntry1] : Anibal Vega presented to the office today for a cardiovascular evaluation. He was last seen in the office in August 2022.\par \par He is now 62 years old, with a history of coronary artery disease. In March of 2008, he developed unstable angina. He was transferred to [facility], where he was found to have a 90% stenosis in his proximal RCA, which was treated with a bare-metal stent. Nonobstructive disease was identified within his LAD at that time.  He had another stress test performed February 23, 2015. After concluding the test, he developed ST segment elevations, and was transferred emergently to [facility]. He was found to have an occluded circumflex with insignificant in-stent restenosis within the RCA stent. Moderate disease was found more distally in the RCA, as well as in the LAD.  Primary angioplasty was performed.  A bare-metal stent was implanted because of a history of bright red blood per rectum.  He has a history of smoking. He has a history of obstructive sleep apnea.  He has been diagnosed with COPD, and he has been taking inhalers intermittently.\par \par At the time of the last visit, he was feeling well.  He reported having had shingles a few weeks prior, but was recovering well.   \par \par He's been doing reasonably well since his last visit, without any recurrent angina. He is smoking.   His wife is suffering from issues with osteoporosis, and only weighs 80 lbs. This has been stressful. He does not check his blood pressure at home.

## 2023-01-26 NOTE — CARDIOLOGY SUMMARY
[___] : [unfilled] [de-identified] : January 26, 2023 sinus rhythm left bundle branch block [de-identified] : 2020\par pharm\par large fixed inf /inf lateral wall infarct\par hypokinesis  [de-identified] : 2020\par EF 50-55%\par inf wall hypokinesis\par mild diastolic dysfunction [de-identified] : 2008 , \par 2015 BMS \par 2018 [facility]RISTIE RCA

## 2023-01-26 NOTE — PHYSICAL EXAM
[Well Developed] : well developed [Well Nourished] : well nourished [No Acute Distress] : no acute distress [Normal Venous Pressure] : normal venous pressure [No Carotid Bruit] : no carotid bruit [Normal S1, S2] : normal S1, S2 [No Murmur] : no murmur [No Rub] : no rub [Clear Lung Fields] : clear lung fields [Good Air Entry] : good air entry [No Respiratory Distress] : no respiratory distress  [Soft] : abdomen soft [Non Tender] : non-tender [No Masses/organomegaly] : no masses/organomegaly [Normal Bowel Sounds] : normal bowel sounds [Normal Gait] : normal gait [No Edema] : no edema [No Cyanosis] : no cyanosis [No Clubbing] : no clubbing [No Varicosities] : no varicosities [No Rash] : no rash [No Skin Lesions] : no skin lesions [Moves all extremities] : moves all extremities [No Focal Deficits] : no focal deficits [Normal Speech] : normal speech [Alert and Oriented] : alert and oriented [Normal memory] : normal memory [General Appearance - Well Developed] : well developed [Normal Appearance] : normal appearance [Well Groomed] : well groomed [General Appearance - Well Nourished] : well nourished [No Deformities] : no deformities [General Appearance - In No Acute Distress] : no acute distress [Normal Conjunctiva] : the conjunctiva exhibited no abnormalities [Eyelids - No Xanthelasma] : the eyelids demonstrated no xanthelasmas [Normal Oral Mucosa] : normal oral mucosa [No Oral Pallor] : no oral pallor [No Oral Cyanosis] : no oral cyanosis [Normal Jugular Venous A Waves Present] : normal jugular venous A waves present [Normal Jugular Venous V Waves Present] : normal jugular venous V waves present [No Jugular Venous Youngblood A Waves] : no jugular venous youngblood A waves [Respiration, Rhythm And Depth] : normal respiratory rhythm and effort [Exaggerated Use Of Accessory Muscles For Inspiration] : no accessory muscle use [Auscultation Breath Sounds / Voice Sounds] : lungs were clear to auscultation bilaterally [Abdomen Soft] : soft [Abdomen Tenderness] : non-tender [Abdomen Mass (___ Cm)] : no abdominal mass palpated [Abnormal Walk] : normal gait [Gait - Sufficient For Exercise Testing] : the gait was sufficient for exercise testing [Nail Clubbing] : no clubbing of the fingernails [Petechial Hemorrhages (___cm)] : no petechial hemorrhages [Cyanosis, Localized] : no localized cyanosis [Skin Color & Pigmentation] : normal skin color and pigmentation [] : no rash [No Venous Stasis] : no venous stasis [No Skin Ulcers] : no skin ulcer [Skin Lesions] : no skin lesions [No Xanthoma] : no  xanthoma was observed [Oriented To Time, Place, And Person] : oriented to person, place, and time [Affect] : the affect was normal [Mood] : the mood was normal [No Anxiety] : not feeling anxious [Normal Rate] : normal [Rhythm Regular] : regular [Normal S1] : normal S1 [Normal S2] : normal S2 [No Gallop] : no gallop heard [2+] : left 2+ [No Pitting Edema] : no pitting edema present [Left Carotid Bruit] : no bruit heard over the left carotid [S3] : no S3 [S4] : no S4 [Right Carotid Bruit] : no bruit heard over the right carotid [Right Femoral Bruit] : no bruit heard over the right femoral artery [Left Femoral Bruit] : no bruit heard over the left femoral artery [Bruit] : no bruit heard

## 2023-02-06 ENCOUNTER — TRANSCRIPTION ENCOUNTER (OUTPATIENT)
Age: 63
End: 2023-02-06

## 2023-02-23 ENCOUNTER — NON-APPOINTMENT (OUTPATIENT)
Age: 63
End: 2023-02-23

## 2023-03-23 ENCOUNTER — APPOINTMENT (OUTPATIENT)
Dept: PULMONOLOGY | Facility: CLINIC | Age: 63
End: 2023-03-23
Payer: COMMERCIAL

## 2023-03-23 VITALS
SYSTOLIC BLOOD PRESSURE: 140 MMHG | BODY MASS INDEX: 30.36 KG/M2 | WEIGHT: 205 LBS | DIASTOLIC BLOOD PRESSURE: 80 MMHG | HEIGHT: 69 IN | TEMPERATURE: 97.4 F | RESPIRATION RATE: 16 BRPM | OXYGEN SATURATION: 97 % | HEART RATE: 60 BPM

## 2023-03-23 PROCEDURE — 94727 GAS DIL/WSHOT DETER LNG VOL: CPT

## 2023-03-23 PROCEDURE — 99406 BEHAV CHNG SMOKING 3-10 MIN: CPT | Mod: 25

## 2023-03-23 PROCEDURE — 95012 NITRIC OXIDE EXP GAS DETER: CPT

## 2023-03-23 PROCEDURE — 94729 DIFFUSING CAPACITY: CPT

## 2023-03-23 PROCEDURE — 94010 BREATHING CAPACITY TEST: CPT

## 2023-03-23 PROCEDURE — 99214 OFFICE O/P EST MOD 30 MIN: CPT | Mod: 25

## 2023-03-23 PROCEDURE — ZZZZZ: CPT

## 2023-03-23 NOTE — ADDENDUM
[FreeTextEntry1] : Documented by Darlin Dash acting as a scribe for Dr. Gavino Mora on 03/23/2023 \par \par All medical record entries made by the Scribe were at my, Dr. Gavino Mora's, direction and personally dictated by me on 03/23/2023 . I have reviewed the chart and agree that the record accurately reflects my personal performance of the history, physical exam, assessment and plan. I have also personally directed, reviewed, and agree with the discharge instructions.

## 2023-03-23 NOTE — PROCEDURE
[FreeTextEntry1] : FULL PFTs reveals mild obstructive flows; FEV1 was 3.29 L which is 94% of predicted; normal lung volumes; mild diffusion of 15.0, which is 60% of predicted; normal flow volume loop \par \par FENO was 11; normal value being less than 25\par Fractional exhaled nitric oxide (FENO) is regarded as a simple, noninvasive method for assessing eosinophilic airway inflammation. Produced by a variety of cells within the lung, nitric oxide (NO) concentrations are generally low in healthy individuals. However, high concentrations of NO appear to be involved in nonspecific host defense mechanisms and chronic inflammatory diseases such as asthma. The American Thoracic Society (ATS) therefore has recommended using FENO to aid in the diagnosis and monitoring of eosinophilic airway inflammation and asthma, and for identifying steroid responsive individuals whose chronic respiratory symptoms may be airway inflammation.

## 2023-03-23 NOTE — ASSESSMENT
[FreeTextEntry1] : Mr. Vega is a 62 years old male with a history of CAD s/p stent #3 11/2018, TIA, COPD, GERD, NAN, obesity, Abnormal CT - still snoring however less shortness of breath. (non-compliant with CPAP) - stress due to Wife's memory - still smoking. - CT ILDz?- stable- except addicted to nicotine and wifes health \par \par SOB is multifactorial due to:\par -obesity\par -COPD/ ILDZ\par -CAD\par -poor breathing mechanics\par \par problem 1: COPD- quiet\par - add breztri 2 BID\par -COPD is a progressive disease and although it can’t be cured , appropriate management can slow its progression, reduce frequency and severity of exacerbations, and improve symptoms and the patient quality of life. Hospitalizations are the greatest contributor to the total COPD costs and account for up to 87% of total COPD related costs. Exacerbations are the main cause of admissions and subsequently account for the 40-75% of COPD costs. Inhaled maintenance therapy reduces the incidence of exacerbations in patients with stable COPD. Incorrect inhaler use and nonadherence are major obstacles to achieving COPD treatment goals. Many COPD patients have challenges (impaired inhalation, limited dexterity, reduced cognition: that limit their ability to correctly use their COPD treatment devices resulting in reduced symptom control. Of most importance is smoking cessation and early intervention with respiratory illnesses and contemplation for pulmonary rehab to enhance quality of life.\par -Inhaler technique reviewed as well as oral hygiene techniques reviewed with patient. Avoidance of cold air, extremes of temperature, rescue inhaler should be used before exercise. Order of medication reviewed with patient. Recommended use of a cool mist humidifier in the bedroom.\par \par problem 2: CAD s/p stent #3 11/2018\par -continue to follow up with Dr. Conley regularly (recommended)\par \par problem 3: Allergy/ Sinus/ PND\par -Add Olopatadine 0.6% at 1 sniff/nostril BID \par Environmental measures for allergies were encouraged including mattress and pillow covers, air purifier, and environmental controls. \par \par problem 4: obesity\par -Weight loss, exercise, and diet control were discussed and are highly encouraged. Treatment options were given such as, aqua therapy, and contacting a nutritionist. Recommended to use the elliptical, stationary bike, less use of treadmill.  Obesity is associated with worsening asthma, shortness of breath, and potential for cardiac disease, diabetes, and other underlying medical conditions.\par \par problem 5: poor breathing mechanics\par -Proper breathing techniques were reviewed with an emphasis of exhalation. Patient instructed to breath in for 1 second and out for four seconds. Patient was encouraged to not talk while walking.\par \par problem 6: OSAS\par -continue to use the CPAP machine, tolerating it well (D/w patient again especially for BP control)\par -continue to use Provigil 200 mg QAM\par \par -Sleep apnea is associated with adverse clinical consequences which an affect most organ systems.  Cardiovascular disease risk includes arrhythmias, atrial fibrillation, hypertension, coronary artery disease, and stroke. Metabolic disorders include diabetes type 2, non-alcoholic fatty liver disease. Mood disorder especially depression; and cognitive decline especially in the elderly. Associations with  chronic reflux/Borrero’s esophagus some but not all inclusive. \par -Reasons to assess this include arousal consistent with hypopnea; respiratory events most prominent in REM sleep or supine position; therefore sleep staging and body position are important for accurate diagnosis and estimation of AHI.\par \par problem 7: nicotine addiction  (discussed 3/23/2023)\par -recommended to use Nicotine Control center / Wellbutrin \par -Discussed for five minutes with the patient the risks/associations with continued smoking including COPD, emphysema, shortness of breath, renal cancer, bladder cancer, stroke risk, cardiac disease, etc. Smoking cessation was discussed at length and highly encouraged. Various options to aid cessation was discussed including use of Chantix, Nicotrol, nicotine products, laser therapy, hypnosis, Wellbutrin, etc.\par \par problem 8: lung cancer screening (noncompliance) - continue yearly- stable\par -follow up chest CT in 5/2023\par Lung cancer screening is recommended for people between the ages of 55 and 80 with prior 30+ pack year smoking histories. There is irrefutable evidence for realization of lung cancer screening based on two large randomized control trials demonstrating relative reduction in lung cancer mortality for patients undergoing low-dose CT scanning. Risks and benefits reviewed with the patient.\par \par Problem 8: Abnormal "ILDz" \par -complete rheumatological blood work - NC\par -Repeat CT 5/2023\par -If progressive, then complete VATS biopsy \par \par problem 9: health maintenance \par -s/p COVID 19 Vaccine x3\par -recommended yearly flu shot (refused)\par -recommended strep pneumonia vaccines: Prevnar-20 vaccine (12/7/2022), followed by Pneumo vaccine 23 one year following\par -recommended early intervention for URIs\par -recommended regular osteoporosis evaluations\par -recommended early dermatological evaluations\par -recommended after the age of 50 to the age of 70, colonoscopy every 5 years \par \par F/U in 6 months with full PFTs\par He is encouraged to call with any changes, concerns, or questions.

## 2023-03-23 NOTE — HISTORY OF PRESENT ILLNESS
[FreeTextEntry1] : Mr. Vega is a 62 year old male with a history of abnormal chest CT, COPD, GERD, nicotine addiction, obesity, NAN, SOB and TIA presenting to the office today for a follow up visit. His chief complaint is\par - he notes his energy levels are alright \par - he has not been getting enough sleep, it has been interrupted. \par - he has been trying to take naps \par - no diarrhea or dizziness\par - no swallowing issues\par - no sour taste in the mouth \par - he has been getting more exercise at work \par - he gets SOB when he moves heavy objects\par - bowels regular\par - no heart burn / reflux \par - his ankles are swollen by the end of the day, he says its notable. \par - he notes he has been losing weight, lost about 10 lbs. \par - he notes his memory has been fine \par - he notes hes' still smoking about 3/4 a pack  \par -He denies any visual issues, headaches, nausea, vomiting, fever, chills, sweats, chest pains, chest pressure, diarrhea, constipation, dysphagia, myalgia, dizziness, leg swelling, leg pain, itchy eyes, itchy ears, heartburn, reflux, or sour taste in the mouth.\par

## 2023-04-08 ENCOUNTER — APPOINTMENT (OUTPATIENT)
Dept: CT IMAGING | Facility: IMAGING CENTER | Age: 63
End: 2023-04-08
Payer: COMMERCIAL

## 2023-04-08 ENCOUNTER — OUTPATIENT (OUTPATIENT)
Dept: OUTPATIENT SERVICES | Facility: HOSPITAL | Age: 63
LOS: 1 days | End: 2023-04-08
Payer: COMMERCIAL

## 2023-04-08 DIAGNOSIS — Z95.5 PRESENCE OF CORONARY ANGIOPLASTY IMPLANT AND GRAFT: Chronic | ICD-10-CM

## 2023-04-08 DIAGNOSIS — F17.200 NICOTINE DEPENDENCE, UNSPECIFIED, UNCOMPLICATED: ICD-10-CM

## 2023-04-08 PROCEDURE — 71250 CT THORAX DX C-: CPT

## 2023-04-08 PROCEDURE — 71250 CT THORAX DX C-: CPT | Mod: 26

## 2023-04-11 ENCOUNTER — NON-APPOINTMENT (OUTPATIENT)
Age: 63
End: 2023-04-11

## 2023-04-14 ENCOUNTER — NON-APPOINTMENT (OUTPATIENT)
Age: 63
End: 2023-04-14

## 2023-04-19 NOTE — PATIENT PROFILE ADULT - TOBACCO USE
In an effort to ensure that our patients [facility], a Team Member has reviewed your chart and identified an opportunity to provide the best care possible. An attempt was made to discuss or schedule overdue Preventive or Disease Management screening.     The Outcome was Contact was not made, no answer/busy If you have any questions or need help with scheduling, contact our Health Outreach Team at [phone]. Care Gaps include Diabetes and Immunizations.      
Current some day smoker

## 2023-05-01 ENCOUNTER — RX RENEWAL (OUTPATIENT)
Age: 63
End: 2023-05-01

## 2023-05-06 ENCOUNTER — RX RENEWAL (OUTPATIENT)
Age: 63
End: 2023-05-06

## 2023-05-08 ENCOUNTER — RX RENEWAL (OUTPATIENT)
Age: 63
End: 2023-05-08

## 2023-05-08 RX ORDER — LISINOPRIL 10 MG/1
10 TABLET ORAL
Qty: 90 | Refills: 3 | Status: ACTIVE | COMMUNITY
Start: 2022-03-22

## 2023-06-23 ENCOUNTER — NON-APPOINTMENT (OUTPATIENT)
Age: 63
End: 2023-06-23

## 2023-07-17 ENCOUNTER — NON-APPOINTMENT (OUTPATIENT)
Age: 63
End: 2023-07-17

## 2023-07-19 ENCOUNTER — NON-APPOINTMENT (OUTPATIENT)
Age: 63
End: 2023-07-19

## 2023-07-19 ENCOUNTER — APPOINTMENT (OUTPATIENT)
Dept: PULMONOLOGY | Facility: CLINIC | Age: 63
End: 2023-07-19
Payer: COMMERCIAL

## 2023-07-19 VITALS
HEIGHT: 69 IN | HEART RATE: 66 BPM | SYSTOLIC BLOOD PRESSURE: 136 MMHG | RESPIRATION RATE: 16 BRPM | OXYGEN SATURATION: 94 % | BODY MASS INDEX: 30.81 KG/M2 | TEMPERATURE: 97.6 F | DIASTOLIC BLOOD PRESSURE: 78 MMHG | WEIGHT: 208 LBS

## 2023-07-19 DIAGNOSIS — R09.82 POSTNASAL DRIP: ICD-10-CM

## 2023-07-19 PROCEDURE — 94729 DIFFUSING CAPACITY: CPT

## 2023-07-19 PROCEDURE — 95012 NITRIC OXIDE EXP GAS DETER: CPT

## 2023-07-19 PROCEDURE — 94727 GAS DIL/WSHOT DETER LNG VOL: CPT

## 2023-07-19 PROCEDURE — 99214 OFFICE O/P EST MOD 30 MIN: CPT | Mod: 25

## 2023-07-19 PROCEDURE — 94010 BREATHING CAPACITY TEST: CPT

## 2023-07-19 NOTE — REASON FOR VISIT
[Follow-Up] : a follow-up visit [FreeTextEntry1] : abnormal chest CT, COPD, GERD, nicotine addiction, obesity, NAN, SOB, ILDz/smoker's bronchiolitis

## 2023-07-19 NOTE — HISTORY OF PRESENT ILLNESS
[FreeTextEntry1] : Mr. Vega is a 63 year old male with a history of abnormal chest CT, COPD, GERD, nicotine addiction, obesity, NAN, SOB and TIA presenting to the office today for a follow up visit. His chief complaint is\par \par -he notes smoking cigarettes\par -he notes stress due to wife's health\par -he notes energy levels are poor\par -he notes poor quality of sleep\par -he denies dysphonia \par -he notes vision is stable \par -he notes bowels are regular \par -he notes exercising (walking)\par -he notes enjoying his work\par -he notes snoring\par -he notes fatigue due to interrupted sleep\par -he notes NC with CPAP\par \par -he denies any headaches, nausea, emesis, fever, chills, sweats, chest pain, chest pressure, coughing, wheezing, palpitations, diarrhea, constipation, dysphagia, vertigo, arthralgias, myalgias, leg swelling, itchy eyes, itchy ears, heartburn, reflux, or sour taste in the mouth.\par

## 2023-07-19 NOTE — PROCEDURE
[FreeTextEntry1] : Full PFT reveals normal flows; FEV1 was  2.65L which is 82% of predicted, with mild obstructive dysfunction at mid-low volumes; normal lung volumes; moderately reduced diffusion at 11.5, which is 47% of predicted; normal flow volume loop.\par PFTs were performed to evaluate for SOB, COPD\par \par PFTs revealed mild restrictive dysfunction and mild obstructive dysfunction; flattened inspiratory limb. PFTs were performed to evaluate for SOB, COPD.\par \par FENO was 14; a normal value being less than 25\par Fractional exhaled nitric oxide (FENO) is regarded as a simple, noninvasive method for assessing eosinophilic airway inflammation. Produced by a variety of cells within the lung, nitric oxide (NO) concentrations are generally low in healthy individuals. However, high concentrations of NO appear to be involved in nonspecific host defense mechanisms and chronic inflammatory diseases such as asthma. The American Thoracic Society (ATS) therefore has recommended using FENO to aid in the diagnosis and monitoring of eosinophilic airway inflammation and asthma, and for identifying steroid responsive individuals whose chronic respiratory symptoms may be caused by airway inflammation.

## 2023-07-19 NOTE — ADDENDUM
[FreeTextEntry1] : Documented by Lynda Doty acting as a scribe for Dr. Gavino Mora on 07/19/2023. All medical record entries made by the Scribe were at my, Dr. Gavino Mora's, direction and personally dictated by me on 07/19/2023. I have reviewed the chart and agree that the record accurately reflects my personal performance of the history, physical exam, assessment and plan. I have also personally directed, reviewed, and agree with the discharge instructions.\par \par

## 2023-07-19 NOTE — ASSESSMENT
[FreeTextEntry1] : Mr. Vega is a 63 years old male with a history of CAD s/p stent #3 11/2018, TIA, COPD, GERD, NAN, obesity, Abnormal CT - still snoring however less shortness of breath. (non-compliant with CPAP) - stress due to Wife's memory - still smoking. - CT ILDz/smoker's bronchiolitis?- stable- except addicted to nicotine and wife's health (still)\par \par SOB is multifactorial due to:\par -obesity\par -COPD/ ILDZ\par -CAD\par -poor breathing mechanics\par \par problem 1: COPD- quiet\par - add breztri 2 BID\par -COPD is a progressive disease and although it can’t be cured , appropriate management can slow its progression, reduce frequency and severity of exacerbations, and improve symptoms and the patient quality of life. Hospitalizations are the greatest contributor to the total COPD costs and account for up to 87% of total COPD related costs. Exacerbations are the main cause of admissions and subsequently account for the 40-75% of COPD costs. Inhaled maintenance therapy reduces the incidence of exacerbations in patients with stable COPD. Incorrect inhaler use and nonadherence are major obstacles to achieving COPD treatment goals. Many COPD patients have challenges (impaired inhalation, limited dexterity, reduced cognition: that limit their ability to correctly use their COPD treatment devices resulting in reduced symptom control. Of most importance is smoking cessation and early intervention with respiratory illnesses and contemplation for pulmonary rehab to enhance quality of life.\par -Inhaler technique reviewed as well as oral hygiene techniques reviewed with patient. Avoidance of cold air, extremes of temperature, rescue inhaler should be used before exercise. Order of medication reviewed with patient. Recommended use of a cool mist humidifier in the bedroom.\par \par problem 2: CAD s/p stent #3 11/2018\par -continue to follow up with Dr. Conley regularly (recommended)\par \par problem 3: Allergy/ Sinus/ PND\par -Add Olopatadine 0.6% at 1 sniff/nostril BID \par Environmental measures for allergies were encouraged including mattress and pillow covers, air purifier, and environmental controls. \par \par problem 4: obesity\par -Weight loss, exercise, and diet control were discussed and are highly encouraged. Treatment options were given such as, aqua therapy, and contacting a nutritionist. Recommended to use the elliptical, stationary bike, less use of treadmill.  Obesity is associated with worsening asthma, shortness of breath, and potential for cardiac disease, diabetes, and other underlying medical conditions.\par \par problem 5: poor breathing mechanics\par -Proper breathing techniques were reviewed with an emphasis of exhalation. Patient instructed to breath in for 1 second and out for four seconds. Patient was encouraged to not talk while walking.\par \par problem 6: OSAS\par -continue to use the CPAP machine, tolerating it well (D/w patient again especially for BP control)\par -continue to use Provigil 200 mg QAM\par \par -Sleep apnea is associated with adverse clinical consequences which an affect most organ systems.  Cardiovascular disease risk includes arrhythmias, atrial fibrillation, hypertension, coronary artery disease, and stroke. Metabolic disorders include diabetes type 2, non-alcoholic fatty liver disease. Mood disorder especially depression; and cognitive decline especially in the elderly. Associations with  chronic reflux/Borrero’s esophagus some but not all inclusive. \par -Reasons to assess this include arousal consistent with hypopnea; respiratory events most prominent in REM sleep or supine position; therefore sleep staging and body position are important for accurate diagnosis and estimation of AHI.\par \par problem 7: nicotine addiction  (discussed 07/19/2023)\par -recommended to use Nicotine Control center / Wellbutrin \par -Discussed for five minutes with the patient the risks/associations with continued smoking including COPD, emphysema, shortness of breath, renal cancer, bladder cancer, stroke risk, cardiac disease, etc. Smoking cessation was discussed at length and highly encouraged. Various options to aid cessation was discussed including use of Chantix, Nicotrol, nicotine products, laser therapy, hypnosis, Wellbutrin, etc.\par \par problem 8: lung cancer screening (noncompliance) - continue yearly- stable\par -follow up chest CT in 4/2024\par Lung cancer screening is recommended for people between the ages of 55 and 80 with prior 30+ pack year smoking histories. There is irrefutable evidence for realization of lung cancer screening based on two large randomized control trials demonstrating relative reduction in lung cancer mortality for patients undergoing low-dose CT scanning. Risks and benefits reviewed with the patient.\par \par Problem 8: Abnormal "ILDz"; Smoker's bronchiolitis, DIP\par -complete rheumatological blood work - NC\par -Repeat CT 4/2024\par -If progressive, then complete VATS biopsy \par \par problem 9: health maintenance \par -s/p COVID 19 Vaccine x3\par -recommended yearly flu shot (refused)\par -recommended strep pneumonia vaccines: Prevnar-20 vaccine (12/7/2022), followed by Pneumo vaccine 23 one year following\par -recommended early intervention for URIs\par -recommended regular osteoporosis evaluations\par -recommended early dermatological evaluations\par -recommended after the age of 50 to the age of 70, colonoscopy every 5 years \par \par F/U in 6 months with full PFTs\par He is encouraged to call with any changes, concerns, or questions.

## 2023-07-26 ENCOUNTER — NON-APPOINTMENT (OUTPATIENT)
Age: 63
End: 2023-07-26

## 2023-07-26 ENCOUNTER — APPOINTMENT (OUTPATIENT)
Dept: CARDIOLOGY | Facility: CLINIC | Age: 63
End: 2023-07-26
Payer: COMMERCIAL

## 2023-07-26 VITALS
OXYGEN SATURATION: 93 % | SYSTOLIC BLOOD PRESSURE: 142 MMHG | HEIGHT: 69 IN | DIASTOLIC BLOOD PRESSURE: 76 MMHG | HEART RATE: 58 BPM | BODY MASS INDEX: 30.51 KG/M2 | WEIGHT: 206 LBS

## 2023-07-26 PROCEDURE — 93000 ELECTROCARDIOGRAM COMPLETE: CPT

## 2023-07-26 PROCEDURE — 99214 OFFICE O/P EST MOD 30 MIN: CPT | Mod: 25

## 2023-07-26 NOTE — DISCUSSION/SUMMARY
[FreeTextEntry1] : Mr. Vega has a history of coronary artery disease, dating back to 2008. He has a history of smoking with associated COPD. He presented in 2015 with exertional dyspnea.  He developed a myocardial infarction following the conclusion of exercise on the treadmill. In 2018 he presented with symptoms consistent with unstable angina, and is status post PCI to RCA.\par \par He presents today in no acute distress.  He remains euvolemic on exam.  ECG illustrates normal sinus, with a left bundle branch block, unchanged from prior.\par \par His blood pressure is elevated, but has been much better controlled at home.\par \par I reviewed his most recent blood work results, including his most recent lipid profile.  I have suggested repeating his blood work.  I reviewed his most recent echocardiogram from August 2022.  This revealed a normal ejection fraction with mild mitral regurgitation and age-appropriate valvular calcification.  I reviewed his most recent nuclear stress test from August 2020, and his most recent coronary angiography from November  \par \par He will schedule an echocardiogram in September, after his daughter's wedding.  I will be in contact with him to discuss the results.  He will see me in 6 months.

## 2023-07-26 NOTE — CARDIOLOGY SUMMARY
[___] : [unfilled] [de-identified] : January 26, 2023 sinus rhythm left bundle branch block\par July 26, 2023 sinus rhythm left bundle branch block [de-identified] : 2020\par pharm\par large fixed inf /inf lateral wall infarct\par hypokinesis  [de-identified] : 2020\par EF 50-55%\par inf wall hypokinesis\par mild diastolic dysfunction [de-identified] : 2008 , \par 2015 BMS \par 2018 [facility] RCA

## 2023-07-26 NOTE — PHYSICAL EXAM
[Well Developed] : well developed [Well Nourished] : well nourished [No Acute Distress] : no acute distress [Normal Venous Pressure] : normal venous pressure [No Carotid Bruit] : no carotid bruit [Normal S1, S2] : normal S1, S2 [No Murmur] : no murmur [No Rub] : no rub [Clear Lung Fields] : clear lung fields [Good Air Entry] : good air entry [No Respiratory Distress] : no respiratory distress  [Soft] : abdomen soft [Non Tender] : non-tender [No Masses/organomegaly] : no masses/organomegaly [Normal Bowel Sounds] : normal bowel sounds [Normal Gait] : normal gait [No Edema] : no edema [No Cyanosis] : no cyanosis [No Clubbing] : no clubbing [No Varicosities] : no varicosities [No Rash] : no rash [No Skin Lesions] : no skin lesions [Moves all extremities] : moves all extremities [No Focal Deficits] : no focal deficits [Normal Speech] : normal speech [Alert and Oriented] : alert and oriented [Normal memory] : normal memory [General Appearance - Well Developed] : well developed [Normal Appearance] : normal appearance [Well Groomed] : well groomed [General Appearance - Well Nourished] : well nourished [No Deformities] : no deformities [General Appearance - In No Acute Distress] : no acute distress [Normal Conjunctiva] : the conjunctiva exhibited no abnormalities [Eyelids - No Xanthelasma] : the eyelids demonstrated no xanthelasmas [Normal Oral Mucosa] : normal oral mucosa [No Oral Pallor] : no oral pallor [No Oral Cyanosis] : no oral cyanosis [Normal Jugular Venous A Waves Present] : normal jugular venous A waves present [Normal Jugular Venous V Waves Present] : normal jugular venous V waves present [No Jugular Venous Youngblood A Waves] : no jugular venous youngblood A waves [Respiration, Rhythm And Depth] : normal respiratory rhythm and effort [Exaggerated Use Of Accessory Muscles For Inspiration] : no accessory muscle use [Auscultation Breath Sounds / Voice Sounds] : lungs were clear to auscultation bilaterally [Abdomen Soft] : soft [Abdomen Tenderness] : non-tender [Abdomen Mass (___ Cm)] : no abdominal mass palpated [Abnormal Walk] : normal gait [Gait - Sufficient For Exercise Testing] : the gait was sufficient for exercise testing [Nail Clubbing] : no clubbing of the fingernails [Cyanosis, Localized] : no localized cyanosis [Petechial Hemorrhages (___cm)] : no petechial hemorrhages [Skin Color & Pigmentation] : normal skin color and pigmentation [] : no rash [No Venous Stasis] : no venous stasis [Skin Lesions] : no skin lesions [No Skin Ulcers] : no skin ulcer [No Xanthoma] : no  xanthoma was observed [Oriented To Time, Place, And Person] : oriented to person, place, and time [Affect] : the affect was normal [No Anxiety] : not feeling anxious [Mood] : the mood was normal [Normal Rate] : normal [Rhythm Regular] : regular [Normal S1] : normal S1 [Normal S2] : normal S2 [No Gallop] : no gallop heard [2+] : left 2+ [No Pitting Edema] : no pitting edema present [Left Carotid Bruit] : no bruit heard over the left carotid [S3] : no S3 [S4] : no S4 [Right Carotid Bruit] : no bruit heard over the right carotid [Right Femoral Bruit] : no bruit heard over the right femoral artery [Left Femoral Bruit] : no bruit heard over the left femoral artery [Bruit] : no bruit heard

## 2023-07-26 NOTE — HISTORY OF PRESENT ILLNESS
[FreeTextEntry1] : Anibal Vega presented to the office today for a cardiovascular evaluation. He was last seen in the office in January, 2023.\par \par He is now 63 years old, with a history of coronary artery disease. In March of 2008, he developed unstable angina. He was transferred to [facility], where he was found to have a 90% stenosis in his proximal RCA, which was treated with a bare-metal stent. Nonobstructive disease was identified within his LAD at that time.  He had another stress test performed February 23, 2015. After concluding the test, he developed ST segment elevations, and was transferred emergently to [facility]. He was found to have an occluded circumflex with insignificant in-stent restenosis within the RCA stent. Moderate disease was found more distally in the RCA, as well as in the LAD.  Primary angioplasty was performed.  A bare-metal stent was implanted because of a history of bright red blood per rectum.  He has a history of smoking. He has a history of obstructive sleep apnea.  He has been diagnosed with COPD, and he has been taking inhalers intermittently.\par \par At the time of the last visit, he was feeling well.   His blood pressure was elevated.  Over time, multiple changes were made to his medications, and his blood pressure was better controlled overall.\par \par He's been doing reasonably well since his last visit, without any recurrent angina. He is smoking but is not finishing as much of each cigarette.   His wife is suffering from issues with osteoporosis, and only weighs 80 lbs. This has been stressful.  His daughter is getting  in August, in [place].

## 2023-08-02 LAB
ALBUMIN SERPL ELPH-MCNC: 3.9 G/DL
ALP BLD-CCNC: 102 U/L
ALT SERPL-CCNC: 20 U/L
ANION GAP SERPL CALC-SCNC: 12 MMOL/L
AST SERPL-CCNC: 24 U/L
BILIRUB SERPL-MCNC: 0.4 MG/DL
BUN SERPL-MCNC: 15 MG/DL
CALCIUM SERPL-MCNC: 9.3 MG/DL
CHLORIDE SERPL-SCNC: 105 MMOL/L
CHOLEST SERPL-MCNC: 142 MG/DL
CO2 SERPL-SCNC: 24 MMOL/L
CREAT SERPL-MCNC: 1.06 MG/DL
EGFR: 78 ML/MIN/1.73M2
ESTIMATED AVERAGE GLUCOSE: 131 MG/DL
GLUCOSE SERPL-MCNC: 95 MG/DL
HBA1C MFR BLD HPLC: 6.2 %
HDLC SERPL-MCNC: 29 MG/DL
LDLC SERPL CALC-MCNC: 84 MG/DL
NONHDLC SERPL-MCNC: 113 MG/DL
POTASSIUM SERPL-SCNC: 4.1 MMOL/L
PROT SERPL-MCNC: 7.7 G/DL
SODIUM SERPL-SCNC: 141 MMOL/L
TRIGL SERPL-MCNC: 161 MG/DL
TSH SERPL-ACNC: 2.01 UIU/ML

## 2023-08-06 ENCOUNTER — RX RENEWAL (OUTPATIENT)
Age: 63
End: 2023-08-06

## 2023-08-06 RX ORDER — ARMODAFINIL 250 MG/1
250 TABLET ORAL
Qty: 30 | Refills: 5 | Status: ACTIVE | COMMUNITY
Start: 2018-02-26 | End: 1900-01-01

## 2023-08-07 ENCOUNTER — RX RENEWAL (OUTPATIENT)
Age: 63
End: 2023-08-07

## 2023-08-11 ENCOUNTER — TRANSCRIPTION ENCOUNTER (OUTPATIENT)
Age: 63
End: 2023-08-11

## 2023-09-13 ENCOUNTER — APPOINTMENT (OUTPATIENT)
Dept: CARDIOLOGY | Facility: CLINIC | Age: 63
End: 2023-09-13
Payer: COMMERCIAL

## 2023-09-13 ENCOUNTER — MED ADMIN CHARGE (OUTPATIENT)
Age: 63
End: 2023-09-13

## 2023-09-13 PROCEDURE — 93306 TTE W/DOPPLER COMPLETE: CPT

## 2023-09-13 RX ORDER — PERFLUTREN 6.52 MG/ML
6.52 INJECTION, SUSPENSION INTRAVENOUS
Qty: 1 | Refills: 0 | Status: COMPLETED | OUTPATIENT
Start: 2023-09-13

## 2023-09-13 RX ADMIN — PERFLUTREN MG/ML: 6.52 INJECTION, SUSPENSION INTRAVENOUS at 00:00

## 2023-09-26 ENCOUNTER — NON-APPOINTMENT (OUTPATIENT)
Age: 63
End: 2023-09-26

## 2023-09-29 ENCOUNTER — APPOINTMENT (OUTPATIENT)
Dept: CARDIOLOGY | Facility: CLINIC | Age: 63
End: 2023-09-29
Payer: COMMERCIAL

## 2023-09-29 PROCEDURE — 78452 HT MUSCLE IMAGE SPECT MULT: CPT

## 2023-09-29 PROCEDURE — 93015 CV STRESS TEST SUPVJ I&R: CPT

## 2023-09-29 PROCEDURE — A9500: CPT

## 2023-11-13 ENCOUNTER — APPOINTMENT (OUTPATIENT)
Dept: PULMONOLOGY | Facility: CLINIC | Age: 63
End: 2023-11-13
Payer: COMMERCIAL

## 2023-11-13 VITALS
DIASTOLIC BLOOD PRESSURE: 72 MMHG | WEIGHT: 196.44 LBS | OXYGEN SATURATION: 93 % | SYSTOLIC BLOOD PRESSURE: 130 MMHG | RESPIRATION RATE: 16 BRPM | BODY MASS INDEX: 29.09 KG/M2 | HEIGHT: 69 IN | TEMPERATURE: 97.8 F | HEART RATE: 62 BPM

## 2023-11-13 PROCEDURE — 94010 BREATHING CAPACITY TEST: CPT

## 2023-11-13 PROCEDURE — 99214 OFFICE O/P EST MOD 30 MIN: CPT | Mod: 25

## 2023-11-24 ENCOUNTER — TRANSCRIPTION ENCOUNTER (OUTPATIENT)
Age: 63
End: 2023-11-24

## 2023-11-24 ENCOUNTER — RX RENEWAL (OUTPATIENT)
Age: 63
End: 2023-11-24

## 2023-11-26 ENCOUNTER — RX RENEWAL (OUTPATIENT)
Age: 63
End: 2023-11-26

## 2023-11-26 RX ORDER — BUDESONIDE, GLYCOPYRROLATE, AND FORMOTEROL FUMARATE 160; 9; 4.8 UG/1; UG/1; UG/1
160-9-4.8 AEROSOL, METERED RESPIRATORY (INHALATION)
Qty: 3 | Refills: 1 | Status: ACTIVE | COMMUNITY
Start: 2022-08-10 | End: 1900-01-01

## 2023-11-27 ENCOUNTER — RX RENEWAL (OUTPATIENT)
Age: 63
End: 2023-11-27

## 2023-11-27 RX ORDER — LISINOPRIL 20 MG/1
20 TABLET ORAL
Qty: 90 | Refills: 3 | Status: ACTIVE | COMMUNITY
Start: 2023-11-27

## 2023-11-28 ENCOUNTER — TRANSCRIPTION ENCOUNTER (OUTPATIENT)
Age: 63
End: 2023-11-28

## 2024-01-08 ENCOUNTER — RX RENEWAL (OUTPATIENT)
Age: 64
End: 2024-01-08

## 2024-01-18 ENCOUNTER — APPOINTMENT (OUTPATIENT)
Dept: CARDIOLOGY | Facility: CLINIC | Age: 64
End: 2024-01-18
Payer: COMMERCIAL

## 2024-01-18 ENCOUNTER — NON-APPOINTMENT (OUTPATIENT)
Age: 64
End: 2024-01-18

## 2024-01-18 VITALS
WEIGHT: 196 LBS | SYSTOLIC BLOOD PRESSURE: 127 MMHG | BODY MASS INDEX: 29.03 KG/M2 | HEART RATE: 66 BPM | OXYGEN SATURATION: 93 % | DIASTOLIC BLOOD PRESSURE: 77 MMHG | HEIGHT: 69 IN

## 2024-01-18 DIAGNOSIS — I25.10 ATHEROSCLEROTIC HEART DISEASE OF NATIVE CORONARY ARTERY W/OUT ANGINA PECTORIS: ICD-10-CM

## 2024-01-18 DIAGNOSIS — I44.7 LEFT BUNDLE-BRANCH BLOCK, UNSPECIFIED: ICD-10-CM

## 2024-01-18 PROCEDURE — 99214 OFFICE O/P EST MOD 30 MIN: CPT | Mod: 25

## 2024-01-18 PROCEDURE — 93000 ELECTROCARDIOGRAM COMPLETE: CPT

## 2024-01-18 NOTE — PHYSICAL EXAM
[Well Developed] : well developed [Well Nourished] : well nourished [No Acute Distress] : no acute distress [Normal Venous Pressure] : normal venous pressure [Normal S1, S2] : normal S1, S2 [No Carotid Bruit] : no carotid bruit [No Murmur] : no murmur [No Rub] : no rub [Clear Lung Fields] : clear lung fields [Good Air Entry] : good air entry [No Respiratory Distress] : no respiratory distress  [Soft] : abdomen soft [Non Tender] : non-tender [No Masses/organomegaly] : no masses/organomegaly [Normal Bowel Sounds] : normal bowel sounds [Normal Gait] : normal gait [No Edema] : no edema [No Clubbing] : no clubbing [No Cyanosis] : no cyanosis [No Varicosities] : no varicosities [No Rash] : no rash [No Skin Lesions] : no skin lesions [Moves all extremities] : moves all extremities [No Focal Deficits] : no focal deficits [Normal Speech] : normal speech [Alert and Oriented] : alert and oriented [Normal memory] : normal memory [Normal Appearance] : normal appearance [General Appearance - Well Developed] : well developed [Well Groomed] : well groomed [General Appearance - Well Nourished] : well nourished [No Deformities] : no deformities [General Appearance - In No Acute Distress] : no acute distress [Eyelids - No Xanthelasma] : the eyelids demonstrated no xanthelasmas [Normal Conjunctiva] : the conjunctiva exhibited no abnormalities [Normal Oral Mucosa] : normal oral mucosa [No Oral Pallor] : no oral pallor [No Oral Cyanosis] : no oral cyanosis [Normal Jugular Venous A Waves Present] : normal jugular venous A waves present [Normal Jugular Venous V Waves Present] : normal jugular venous V waves present [No Jugular Venous Youngblood A Waves] : no jugular venous youngblood A waves [Exaggerated Use Of Accessory Muscles For Inspiration] : no accessory muscle use [Respiration, Rhythm And Depth] : normal respiratory rhythm and effort [Auscultation Breath Sounds / Voice Sounds] : lungs were clear to auscultation bilaterally [Abdomen Soft] : soft [Abdomen Tenderness] : non-tender [Abdomen Mass (___ Cm)] : no abdominal mass palpated [Abnormal Walk] : normal gait [Gait - Sufficient For Exercise Testing] : the gait was sufficient for exercise testing [Nail Clubbing] : no clubbing of the fingernails [Cyanosis, Localized] : no localized cyanosis [Petechial Hemorrhages (___cm)] : no petechial hemorrhages [Skin Color & Pigmentation] : normal skin color and pigmentation [] : no rash [No Venous Stasis] : no venous stasis [Skin Lesions] : no skin lesions [No Skin Ulcers] : no skin ulcer [No Xanthoma] : no  xanthoma was observed [Oriented To Time, Place, And Person] : oriented to person, place, and time [Affect] : the affect was normal [Mood] : the mood was normal [No Anxiety] : not feeling anxious [Normal Rate] : normal [Rhythm Regular] : regular [Normal S1] : normal S1 [Normal S2] : normal S2 [No Gallop] : no gallop heard [2+] : left 2+ [No Pitting Edema] : no pitting edema present [Left Carotid Bruit] : no bruit heard over the left carotid [S3] : no S3 [S4] : no S4 [Right Carotid Bruit] : no bruit heard over the right carotid [Right Femoral Bruit] : no bruit heard over the right femoral artery [Left Femoral Bruit] : no bruit heard over the left femoral artery [Bruit] : no bruit heard

## 2024-01-18 NOTE — DISCUSSION/SUMMARY
[FreeTextEntry1] : Mr. Vega has a history of coronary artery disease, dating back to 2008. He has a history of smoking with associated COPD. He presented in 2015 with exertional dyspnea.  He developed a myocardial infarction following the conclusion of exercise on the treadmill. In 2018 he presented with symptoms consistent with unstable angina, and is status post PCI to RCA.  He presents today in no acute distress.  He remains euvolemic on exam.  ECG illustrates normal sinus, with a left bundle branch block, unchanged from prior.  His blood pressure is better.  I reviewed his most recent blood work results, including his most recent lipid profile. I reviewed his echocardiogram from August 2022.  This revealed a normal ejection fraction with mild mitral regurgitation and age-appropriate valvular calcification.  I reviewed his nuclear stress test from August 2020, and his most recent coronary angiography.  I reviewed his echocardiogram from September 13, 2023.  This was a technically difficult study. Left ventricular systolic function was visually estimated at 30 to 35%, with multiple wall motion abnormalities.  Pharmacologic nuclear stress testing was performed September 29, 2023.  This revealed a large inferior and inferolateral defect consistent with infarct, an ejection fraction of 53%.  I am not sure what his discomfort was.  It strikes me as being musculoskeletal.  His EKG, with a left bundle branch block, is not easily interpretable for ischemia.  He just had a stress test several months ago, which of course is not an ideal way to tell what happened a week ago, but is reassuring nonetheless.  After some discussion, we have agreed to observe him over a period of time, for what is hopefully just a musculoskeletal discomfort, which will hopefully not recur.  He will see me in 6 weeks. [EKG obtained to assist in diagnosis and management of assessed problem(s)] : EKG obtained to assist in diagnosis and management of assessed problem(s)

## 2024-01-18 NOTE — HISTORY OF PRESENT ILLNESS
[FreeTextEntry1] : Anibal Vega presented to the office today for a cardiovascular evaluation. He was last seen in the office 6 months ago.  He is now 63 years old, with a history of coronary artery disease. In March of 2008, he developed unstable angina. He was transferred to [facility], where he was found to have a 90% stenosis in his proximal RCA, which was treated with a bare-metal stent. Nonobstructive disease was identified within his LAD at that time.  He had another stress test performed February 23, 2015. After concluding the test, he developed ST segment elevations, and was transferred emergently to [facility]. He was found to have an occluded circumflex with insignificant in-stent restenosis within the RCA stent. Moderate disease was found more distally in the RCA, as well as in the LAD.  Primary angioplasty was performed.  A bare-metal stent was implanted because of a history of bright red blood per rectum.  He has a history of smoking. He has a history of obstructive sleep apnea.  He has been diagnosed with COPD, and he has been taking inhalers intermittently.  Over time, multiple changes were made to his medications, and his blood pressure was better controlled overall.  Approximately 1 week ago, under conditions of severe emotional stress, he had severe chest and back discomfort.  He reports that his wife has been ill, after being admitted to the hospital with pneumonia, and a course in rehab.  She remains very debilitated, and he has been the primary caretaker.  He developed severe chest and back discomfort, such that he needed to lie down.  She could not find a comfortable position.  He did not report any dyspnea.  He was considering going to the hospital, but did not feel that he was able to leave his wife alone, and so he stayed home.  He woke up the next morning, and felt fine.  He has not had any real recurrences of discomfort, though he has twinges of migratory chest and back discomfort intermittently, at rest.

## 2024-01-18 NOTE — CARDIOLOGY SUMMARY
[___] : [unfilled] [de-identified] : January 26, 2023 sinus rhythm left bundle branch block July 26, 2023 sinus rhythm left bundle branch block January 18, 2024 normal sinus rhythm left bundle branch block [de-identified] : 2020\par  pharm\par  large fixed inf /inf lateral wall infarct\par  hypokinesis  [de-identified] : 2020\par  EF 50-55%\par  inf wall hypokinesis\par  mild diastolic dysfunction [de-identified] : 2008 , \par  2015 BMS \par  2018 [facility] RCA

## 2024-01-20 LAB
ALBUMIN SERPL ELPH-MCNC: 3.9 G/DL
ALP BLD-CCNC: 105 U/L
ALT SERPL-CCNC: 13 U/L
ANION GAP SERPL CALC-SCNC: 9 MMOL/L
AST SERPL-CCNC: 18 U/L
BILIRUB SERPL-MCNC: 0.3 MG/DL
BUN SERPL-MCNC: 17 MG/DL
CALCIUM SERPL-MCNC: 9.3 MG/DL
CHLORIDE SERPL-SCNC: 101 MMOL/L
CHOLEST SERPL-MCNC: 145 MG/DL
CO2 SERPL-SCNC: 26 MMOL/L
CREAT SERPL-MCNC: 1.07 MG/DL
EGFR: 77 ML/MIN/1.73M2
ESTIMATED AVERAGE GLUCOSE: 137 MG/DL
GLUCOSE SERPL-MCNC: 90 MG/DL
HBA1C MFR BLD HPLC: 6.4 %
HCT VFR BLD CALC: 46.6 %
HDLC SERPL-MCNC: 28 MG/DL
HGB BLD-MCNC: 15 G/DL
LDLC SERPL CALC-MCNC: 88 MG/DL
MCHC RBC-ENTMCNC: 29.2 PG
MCHC RBC-ENTMCNC: 32.2 GM/DL
MCV RBC AUTO: 90.7 FL
NONHDLC SERPL-MCNC: 117 MG/DL
PLATELET # BLD AUTO: 297 K/UL
POTASSIUM SERPL-SCNC: 4.3 MMOL/L
PROT SERPL-MCNC: 7.7 G/DL
RBC # BLD: 5.14 M/UL
RBC # FLD: 13.2 %
SODIUM SERPL-SCNC: 136 MMOL/L
TRIGL SERPL-MCNC: 166 MG/DL
TSH SERPL-ACNC: 3.42 UIU/ML
WBC # FLD AUTO: 9.72 K/UL

## 2024-03-05 ENCOUNTER — NON-APPOINTMENT (OUTPATIENT)
Age: 64
End: 2024-03-05

## 2024-03-05 ENCOUNTER — APPOINTMENT (OUTPATIENT)
Dept: CARDIOLOGY | Facility: CLINIC | Age: 64
End: 2024-03-05
Payer: COMMERCIAL

## 2024-03-05 VITALS
DIASTOLIC BLOOD PRESSURE: 78 MMHG | WEIGHT: 198 LBS | HEART RATE: 66 BPM | SYSTOLIC BLOOD PRESSURE: 133 MMHG | OXYGEN SATURATION: 91 % | HEIGHT: 69 IN | BODY MASS INDEX: 29.33 KG/M2

## 2024-03-05 PROCEDURE — G2211 COMPLEX E/M VISIT ADD ON: CPT | Mod: NC,1L

## 2024-03-05 PROCEDURE — 99214 OFFICE O/P EST MOD 30 MIN: CPT | Mod: 25

## 2024-03-05 PROCEDURE — 93000 ELECTROCARDIOGRAM COMPLETE: CPT

## 2024-03-05 NOTE — CARDIOLOGY SUMMARY
[___] : [unfilled] [de-identified] : January 26, 2023 sinus rhythm left bundle branch block July 26, 2023 sinus rhythm left bundle branch block January 18, 2024 normal sinus rhythm left bundle branch block March 5, 2024 normal sinus rhythm left bundle branch block [de-identified] : 2020\par  pharm\par  large fixed inf /inf lateral wall infarct\par  hypokinesis  [de-identified] : 2020\par  EF 50-55%\par  inf wall hypokinesis\par  mild diastolic dysfunction [de-identified] : 2008 , \par  2015 BMS \par  2018 [facility] RCA

## 2024-03-05 NOTE — PHYSICAL EXAM
[Well Developed] : well developed [No Acute Distress] : no acute distress [Well Nourished] : well nourished [Normal Venous Pressure] : normal venous pressure [No Carotid Bruit] : no carotid bruit [Normal S1, S2] : normal S1, S2 [No Murmur] : no murmur [No Rub] : no rub [Clear Lung Fields] : clear lung fields [Good Air Entry] : good air entry [No Respiratory Distress] : no respiratory distress  [Soft] : abdomen soft [Non Tender] : non-tender [No Masses/organomegaly] : no masses/organomegaly [Normal Bowel Sounds] : normal bowel sounds [Normal Gait] : normal gait [No Edema] : no edema [No Cyanosis] : no cyanosis [No Clubbing] : no clubbing [No Rash] : no rash [No Varicosities] : no varicosities [No Skin Lesions] : no skin lesions [Moves all extremities] : moves all extremities [No Focal Deficits] : no focal deficits [Normal Speech] : normal speech [Alert and Oriented] : alert and oriented [Normal memory] : normal memory [Normal Appearance] : normal appearance [General Appearance - Well Developed] : well developed [Well Groomed] : well groomed [General Appearance - Well Nourished] : well nourished [General Appearance - In No Acute Distress] : no acute distress [No Deformities] : no deformities [Normal Conjunctiva] : the conjunctiva exhibited no abnormalities [Eyelids - No Xanthelasma] : the eyelids demonstrated no xanthelasmas [Normal Oral Mucosa] : normal oral mucosa [No Oral Pallor] : no oral pallor [No Oral Cyanosis] : no oral cyanosis [Normal Jugular Venous A Waves Present] : normal jugular venous A waves present [Normal Jugular Venous V Waves Present] : normal jugular venous V waves present [No Jugular Venous Youngblood A Waves] : no jugular venous youngblood A waves [Respiration, Rhythm And Depth] : normal respiratory rhythm and effort [Exaggerated Use Of Accessory Muscles For Inspiration] : no accessory muscle use [Auscultation Breath Sounds / Voice Sounds] : lungs were clear to auscultation bilaterally [Abdomen Tenderness] : non-tender [Abdomen Soft] : soft [Abnormal Walk] : normal gait [Abdomen Mass (___ Cm)] : no abdominal mass palpated [Gait - Sufficient For Exercise Testing] : the gait was sufficient for exercise testing [Nail Clubbing] : no clubbing of the fingernails [Cyanosis, Localized] : no localized cyanosis [Petechial Hemorrhages (___cm)] : no petechial hemorrhages [Skin Color & Pigmentation] : normal skin color and pigmentation [No Venous Stasis] : no venous stasis [] : no rash [Skin Lesions] : no skin lesions [No Xanthoma] : no  xanthoma was observed [No Skin Ulcers] : no skin ulcer [Oriented To Time, Place, And Person] : oriented to person, place, and time [Affect] : the affect was normal [Mood] : the mood was normal [No Anxiety] : not feeling anxious [Normal Rate] : normal [Normal S1] : normal S1 [Rhythm Regular] : regular [No Gallop] : no gallop heard [Normal S2] : normal S2 [2+] : left 2+ [No Pitting Edema] : no pitting edema present [Left Carotid Bruit] : no bruit heard over the left carotid [S3] : no S3 [S4] : no S4 [Right Carotid Bruit] : no bruit heard over the right carotid [Right Femoral Bruit] : no bruit heard over the right femoral artery [Bruit] : no bruit heard [Left Femoral Bruit] : no bruit heard over the left femoral artery

## 2024-03-05 NOTE — DISCUSSION/SUMMARY
[FreeTextEntry1] : Mr. Vega has a history of coronary artery disease, dating back to 2008. He has a history of smoking with associated COPD. He presented in 2015 with exertional dyspnea.  He developed a myocardial infarction following the conclusion of exercise on the treadmill. In 2018 he presented with symptoms consistent with unstable angina, and is status post PCI to RCA.  He presents today in no acute distress.  He remains euvolemic on exam.  ECG illustrates normal sinus, with a left bundle branch block, unchanged from prior.  His blood pressure is better.  I reviewed his most recent blood work results, including his most recent lipid profile. I reviewed his echocardiogram from August 2022.  This revealed a normal ejection fraction with mild mitral regurgitation and age-appropriate valvular calcification.  I reviewed his nuclear stress test from August 2020, and his most recent coronary angiography.  I reviewed his echocardiogram from September 13, 2023.  This was a technically difficult study. Left ventricular systolic function was visually estimated at 30 to 35%, with multiple wall motion abnormalities.  Pharmacologic nuclear stress testing was performed September 29, 2023.  This revealed a large inferior and inferolateral defect consistent with infarct, an ejection fraction of 53%.  I am not sure what his discomfort was.  It strikes me as being musculoskeletal.  His EKG, with a left bundle branch block, is not easily interpretable for ischemia.  He just had a stress test several months ago, which of course is not an ideal way to tell what happened a week ago, but is reassuring nonetheless.  I do not think I would test further at this time.  He had an extensive conversation about his diet.  He has a doughnut and coffee for breakfast, Posta several times a week, and is overall not eating in a proper fashion.  Unfortunately he is very pressed for time noting his wife's debilitated state.  She is eating 2 pounds of M&Ms a week, and he is surrounded by similar temptations.  I have encouraged him to make small changes in his diet.  Hopefully that will work at least to a certain degree.  I think he can avoid additional testing at this time.  I suggest a follow-up in about 6 months.  [EKG obtained to assist in diagnosis and management of assessed problem(s)] : EKG obtained to assist in diagnosis and management of assessed problem(s)

## 2024-03-05 NOTE — REASON FOR VISIT
[Follow-Up - Clinic] : a clinic follow-up of [Dyspnea] : dyspnea [Coronary Artery Disease] : coronary artery disease

## 2024-03-05 NOTE — HISTORY OF PRESENT ILLNESS
[FreeTextEntry1] : Anibal Vega presented to the office today for a cardiovascular evaluation. He was last seen in the office 6 weeks ago.  He is now 63 years old, with a history of coronary artery disease. In March of 2008, he developed unstable angina. He was transferred to [facility], where he was found to have a 90% stenosis in his proximal RCA, which was treated with a bare-metal stent. Nonobstructive disease was identified within his LAD at that time.  He had another stress test performed February 23, 2015. After concluding the test, he developed ST segment elevations, and was transferred emergently to [facility]. He was found to have an occluded circumflex with insignificant in-stent restenosis within the RCA stent. Moderate disease was found more distally in the RCA, as well as in the LAD.  Primary angioplasty was performed.  A bare-metal stent was implanted because of a history of bright red blood per rectum.  He has a history of smoking. He has a history of obstructive sleep apnea.  He has been diagnosed with COPD, and he has been taking inhalers intermittently.  Over time, multiple changes were made to his medications, and his blood pressure was better controlled overall.  He presented to the office in January, 2020 for having had severe chest and back discomfort in the context of severe emotional stress.  His wife had been ill, with pneumonia and a course of rehabilitation.  He was considering going to the hospital but did not because he was afraid to leave his wife alone.  He felt fine by the next morning.  We decided not to pursue additional testing at that time, noting a relatively recent set of examinations.  I asked him to return to the office today.  He presents to the office today having been feeling well from a cardiovascular perspective.  He reports no chest discomfort or shortness of breath with activity.  He denies orthopnea, PND and lower extremity edema.  He denies palpitations, dizziness and syncope.  He has tried to remain physically active.   His diet remains poor.

## 2024-03-11 ENCOUNTER — TRANSCRIPTION ENCOUNTER (OUTPATIENT)
Age: 64
End: 2024-03-11

## 2024-03-11 ENCOUNTER — RX RENEWAL (OUTPATIENT)
Age: 64
End: 2024-03-11

## 2024-03-12 RX ORDER — AMLODIPINE BESYLATE 10 MG/1
10 TABLET ORAL DAILY
Qty: 90 | Refills: 3 | Status: ACTIVE | COMMUNITY
Start: 2023-04-04

## 2024-03-28 ENCOUNTER — APPOINTMENT (OUTPATIENT)
Dept: PULMONOLOGY | Facility: CLINIC | Age: 64
End: 2024-03-28
Payer: COMMERCIAL

## 2024-03-28 VITALS
OXYGEN SATURATION: 92 % | DIASTOLIC BLOOD PRESSURE: 68 MMHG | SYSTOLIC BLOOD PRESSURE: 144 MMHG | WEIGHT: 195 LBS | HEART RATE: 63 BPM | TEMPERATURE: 97.2 F | HEIGHT: 69 IN | RESPIRATION RATE: 16 BRPM | BODY MASS INDEX: 28.88 KG/M2

## 2024-03-28 DIAGNOSIS — J44.1 CHRONIC OBSTRUCTIVE PULMONARY DISEASE WITH (ACUTE) EXACERBATION: ICD-10-CM

## 2024-03-28 DIAGNOSIS — J43.9 EMPHYSEMA, UNSPECIFIED: ICD-10-CM

## 2024-03-28 DIAGNOSIS — G47.33 OBSTRUCTIVE SLEEP APNEA (ADULT) (PEDIATRIC): ICD-10-CM

## 2024-03-28 DIAGNOSIS — R06.02 SHORTNESS OF BREATH: ICD-10-CM

## 2024-03-28 DIAGNOSIS — K21.9 GASTRO-ESOPHAGEAL REFLUX DISEASE W/OUT ESOPHAGITIS: ICD-10-CM

## 2024-03-28 DIAGNOSIS — J84.9 INTERSTITIAL PULMONARY DISEASE, UNSPECIFIED: ICD-10-CM

## 2024-03-28 DIAGNOSIS — R93.89 ABNORMAL FINDINGS ON DIAGNOSTIC IMAGING OF OTHER SPECIFIED BODY STRUCTURES: ICD-10-CM

## 2024-03-28 DIAGNOSIS — F17.200 NICOTINE DEPENDENCE, UNSPECIFIED, UNCOMPLICATED: ICD-10-CM

## 2024-03-28 DIAGNOSIS — E66.9 OBESITY, UNSPECIFIED: ICD-10-CM

## 2024-03-28 PROCEDURE — 94729 DIFFUSING CAPACITY: CPT

## 2024-03-28 PROCEDURE — ZZZZZ: CPT

## 2024-03-28 PROCEDURE — 99214 OFFICE O/P EST MOD 30 MIN: CPT | Mod: 25

## 2024-03-28 PROCEDURE — 94010 BREATHING CAPACITY TEST: CPT

## 2024-03-28 PROCEDURE — 94727 GAS DIL/WSHOT DETER LNG VOL: CPT

## 2024-03-28 PROCEDURE — 95012 NITRIC OXIDE EXP GAS DETER: CPT

## 2024-03-28 PROCEDURE — 99406 BEHAV CHNG SMOKING 3-10 MIN: CPT | Mod: 25

## 2024-03-28 RX ORDER — PREDNISONE 10 MG/1
10 TABLET ORAL
Qty: 50 | Refills: 0 | Status: ACTIVE | COMMUNITY
Start: 2024-03-28 | End: 1900-01-01

## 2024-03-28 NOTE — ADDENDUM
[FreeTextEntry1] : Documented by Daniel Yepez acting as a scribe for Dr. Gavino Mora on 03/28/2024. All medical record entries made by the Scribe were at my, Dr. Gavino Mora's, direction and personally dictated by me on 03/28/2024. I have reviewed the chart and agree that the record accurately reflects my personal performance of the history, physical exam, assessment and plan. I have also personally directed, reviewed, and agree with the discharge instructions.

## 2024-03-28 NOTE — HISTORY OF PRESENT ILLNESS
Patient's blood pressure is stable    Continue hydrochlorothiazide 12 5 milligram, losartan 50 milligram  [FreeTextEntry1] : Mr. Vega is a 63 year old male with a history of abnormal chest CT, COPD, GERD, nicotine addiction, obesity, NAN, SOB and TIA presenting to the office today for a follow up visit. His chief complaint is  -he notes feeling generally well -he notes his wife is bed ridden  -he notes stress  -he notes chronic cough  -he notes poor quality of sleep -he notes his sleep is broken  -he denies exercising -he notes work is busy  -he notes sinuses are leaky  -he note staking flonase  -he notes cutting down smoking  -he notes cough is unproductive   -he denies any headaches, nausea, emesis, fever, chills, sweats, chest pain, chest pressure, wheezing, palpitations, diarrhea, constipation, dysphagia, vertigo, arthralgias, myalgias, leg swelling, itchy eyes, itchy ears, heartburn, reflux, or sour taste in the mouth.

## 2024-03-28 NOTE — PROCEDURE
[FreeTextEntry1] : Full PFT reveals mild obstructive dysfunction at mid to low volumes; FEV1 was 2.69 L which is 83% of predicted; normal lung volumes; moderately reduced diffusion at 12.3, which is 51% of predicted; normal flow volume loop. PFTs were performed to evaluate for SOB and COPD   FENO was 14; a normal value being less than 25 Fractional exhaled nitric oxide (FENO) is regarded as a simple, noninvasive method for assessing eosinophilic airway inflammation. Produced by a variety of cells within the lung, nitric oxide (NO) concentrations are generally low in healthy individuals. However, high concentrations of NO appear to be involved in nonspecific host defense mechanisms and chronic inflammatory diseases such as asthma. The American Thoracic Society (ATS) therefore has recommended using FENO to aid in the diagnosis and monitoring of eosinophilic airway inflammation and asthma, and for identifying steroid responsive individuals whose chronic respiratory symptoms may be caused by airway inflammation.   The American Thoracic Society (ATS) strongly recommends the use of FeNO measurement to aid in the assessment, management, and long-term monitoring of asthma. In their 2011 clinical practice guideline, the ATS emphasizes the importance of using FeNO.

## 2024-03-28 NOTE — ASSESSMENT
[FreeTextEntry1] : Mr. Vega is a 63 years old male with a history of CAD s/p stent #3 11/2018, TIA, COPD, GERD, NAN, obesity, Abnormal CT - still snoring however less shortness of breath. (non-compliant with CPAP) - stress due to Wife's memory - still smoking. - CT ILDz/smoker's bronchiolitis?- stable- except addicted to nicotine and wife's health (still); Depressed; cough NP since January   SOB is multifactorial due to: -obesity -COPD/ ILDZ -CAD -poor breathing mechanics  problem 1: COPD - active  - add breztri 2 BID -add  mg q8H; Prednisone 20 mg x 7 days, 10 mg x 7 days -COPD is a progressive disease and although it can't be cured , appropriate management can slow its progression, reduce frequency and severity of exacerbations, and improve symptoms and the patient quality of life. Hospitalizations are the greatest contributor to the total COPD costs and account for up to 87% of total COPD related costs. Exacerbations are the main cause of admissions and subsequently account for the 40-75% of COPD costs. Inhaled maintenance therapy reduces the incidence of exacerbations in patients with stable COPD. Incorrect inhaler use and nonadherence are major obstacles to achieving COPD treatment goals. Many COPD patients have challenges (impaired inhalation, limited dexterity, reduced cognition: that limit their ability to correctly use their COPD treatment devices resulting in reduced symptom control. Of most importance is smoking cessation and early intervention with respiratory illnesses and contemplation for pulmonary rehab to enhance quality of life. -Inhaler technique reviewed as well as oral hygiene techniques reviewed with patient. Avoidance of cold air, extremes of temperature, rescue inhaler should be used before exercise. Order of medication reviewed with patient. Recommended use of a cool mist humidifier in the bedroom.  problem 2: CAD s/p stent #3 11/2018 -continue to follow up with Dr. Conley regularly (recommended) - 7/2023  problem 3: Allergy/ Sinus/ PND -Add Olopatadine 0.6% at 1 sniff/nostril BID Environmental measures for allergies were encouraged including mattress and pillow covers, air purifier, and environmental controls.  problem 4: obesity - in progress  -recommended Berberine OTC  -Weight loss, exercise, and diet control were discussed and are highly encouraged. Treatment options were given such as, aqua therapy, and contacting a nutritionist. Recommended to use the elliptical, stationary bike, less use of treadmill. Obesity is associated with worsening asthma, shortness of breath, and potential for cardiac disease, diabetes, and other underlying medical conditions.  problem 5: poor breathing mechanics -Proper breathing techniques were reviewed with an emphasis of exhalation. Patient instructed to breath in for 1 second and out for four seconds. Patient was encouraged to not talk while walking.  problem 6: OSAS -continue to use the CPAP machine, tolerating it well (D/w patient again especially for BP control) -continue to use Provigil 200 mg QAM -Sleep apnea is associated with adverse clinical consequences which an affect most organ systems. Cardiovascular disease risk includes arrhythmias, atrial fibrillation, hypertension, coronary artery disease, and stroke. Metabolic disorders include diabetes type 2, non-alcoholic fatty liver disease. Mood disorder especially depression; and cognitive decline especially in the elderly. Associations with chronic reflux/Borrero's esophagus some but not all inclusive. -Reasons to assess this include arousal consistent with hypopnea; respiratory events most prominent in REM sleep or supine position; therefore sleep staging and body position are important for accurate diagnosis and estimation of AHI.  problem 7: nicotine addiction (discussed 03/28/2024) -recommended to use Nicotine Control center / Wellbutrin -Discussed for five minutes with the patient the risks/associations with continued smoking including COPD, emphysema, shortness of breath, renal cancer, bladder cancer, stroke risk, cardiac disease, etc. Smoking cessation was discussed at length and highly encouraged. Various options to aid cessation was discussed including use of Chantix, Nicotrol, nicotine products, laser therapy, hypnosis, Wellbutrin, etc.  problem 8: lung cancer screening (noncompliance) - continue yearly - stable -follow up chest CT in 3/2025 Lung cancer screening is recommended for people between the ages of 55 and 80 with prior 30+ pack year smoking histories. There is irrefutable evidence for realization of lung cancer screening based on two large randomized control trials demonstrating relative reduction in lung cancer mortality for patients undergoing low-dose CT scanning. Risks and benefits reviewed with the patient.  Problem 9: Abnormal "ILDz"; Smoker's bronchiolitis, DIP -complete rheumatological blood work - NC -Repeat CT 3/2025 -If progressive, then complete VATS biopsy  problem 10: health maintenance -s/p COVID 19 Vaccine x3 -recommended yearly flu shot (refused) -recommended strep pneumonia vaccines: Prevnar-20 vaccine (12/7/2022), followed by Pneumo vaccine 23 one year following -recommended early intervention for URIs -recommended regular osteoporosis evaluations -recommended early dermatological evaluations -recommended after the age of 50 to the age of 70, colonoscopy every 5 years  F/U in 6 months with full PFTs He is encouraged to call with any changes, concerns, or questions.

## 2024-04-14 ENCOUNTER — RX RENEWAL (OUTPATIENT)
Age: 64
End: 2024-04-14

## 2024-04-14 RX ORDER — OLOPATADINE HYDROCHLORIDE 665 UG/1
0.6 SPRAY, METERED NASAL
Qty: 3 | Refills: 1 | Status: ACTIVE | COMMUNITY
Start: 2022-08-10 | End: 1900-01-01

## 2024-05-13 ENCOUNTER — RX RENEWAL (OUTPATIENT)
Age: 64
End: 2024-05-13

## 2024-05-13 RX ORDER — METOPROLOL TARTRATE 25 MG/1
25 TABLET, FILM COATED ORAL
Qty: 180 | Refills: 3 | Status: ACTIVE | COMMUNITY
Start: 2023-05-01 | End: 1900-01-01

## 2024-06-01 ENCOUNTER — APPOINTMENT (OUTPATIENT)
Dept: CT IMAGING | Facility: IMAGING CENTER | Age: 64
End: 2024-06-01
Payer: COMMERCIAL

## 2024-06-01 ENCOUNTER — OUTPATIENT (OUTPATIENT)
Dept: OUTPATIENT SERVICES | Facility: HOSPITAL | Age: 64
LOS: 1 days | End: 2024-06-01
Payer: COMMERCIAL

## 2024-06-01 DIAGNOSIS — F17.200 NICOTINE DEPENDENCE, UNSPECIFIED, UNCOMPLICATED: ICD-10-CM

## 2024-06-01 DIAGNOSIS — J84.9 INTERSTITIAL PULMONARY DISEASE, UNSPECIFIED: ICD-10-CM

## 2024-06-01 DIAGNOSIS — Z95.5 PRESENCE OF CORONARY ANGIOPLASTY IMPLANT AND GRAFT: Chronic | ICD-10-CM

## 2024-06-01 DIAGNOSIS — R93.89 ABNORMAL FINDINGS ON DIAGNOSTIC IMAGING OF OTHER SPECIFIED BODY STRUCTURES: ICD-10-CM

## 2024-06-01 PROCEDURE — 71250 CT THORAX DX C-: CPT

## 2024-06-01 PROCEDURE — 71250 CT THORAX DX C-: CPT | Mod: 26

## 2024-06-12 ENCOUNTER — RX RENEWAL (OUTPATIENT)
Age: 64
End: 2024-06-12

## 2024-06-12 RX ORDER — ARMODAFINIL 250 MG/1
250 TABLET ORAL
Qty: 30 | Refills: 5 | Status: ACTIVE | COMMUNITY
Start: 2017-05-01 | End: 1900-01-01

## 2024-09-10 ENCOUNTER — RX RENEWAL (OUTPATIENT)
Age: 64
End: 2024-09-10

## 2024-09-11 ENCOUNTER — APPOINTMENT (OUTPATIENT)
Dept: CARDIOLOGY | Facility: CLINIC | Age: 64
End: 2024-09-11
Payer: COMMERCIAL

## 2024-09-11 ENCOUNTER — NON-APPOINTMENT (OUTPATIENT)
Age: 64
End: 2024-09-11

## 2024-09-11 VITALS
OXYGEN SATURATION: 96 % | BODY MASS INDEX: 28.73 KG/M2 | WEIGHT: 194 LBS | SYSTOLIC BLOOD PRESSURE: 135 MMHG | DIASTOLIC BLOOD PRESSURE: 81 MMHG | HEART RATE: 59 BPM | HEIGHT: 69 IN

## 2024-09-11 VITALS — OXYGEN SATURATION: 96 % | WEIGHT: 194 LBS | HEART RATE: 59 BPM | BODY MASS INDEX: 28.73 KG/M2 | HEIGHT: 69 IN

## 2024-09-11 DIAGNOSIS — I25.10 ATHEROSCLEROTIC HEART DISEASE OF NATIVE CORONARY ARTERY W/OUT ANGINA PECTORIS: ICD-10-CM

## 2024-09-11 PROCEDURE — 93000 ELECTROCARDIOGRAM COMPLETE: CPT

## 2024-09-11 PROCEDURE — 99214 OFFICE O/P EST MOD 30 MIN: CPT | Mod: 25

## 2024-09-11 PROCEDURE — G2211 COMPLEX E/M VISIT ADD ON: CPT | Mod: NC

## 2024-09-11 NOTE — PHYSICAL EXAM
[Well Developed] : well developed [Well Nourished] : well nourished [No Acute Distress] : no acute distress [Normal Venous Pressure] : normal venous pressure [No Carotid Bruit] : no carotid bruit [Normal S1, S2] : normal S1, S2 [No Murmur] : no murmur [No Rub] : no rub [Clear Lung Fields] : clear lung fields [Good Air Entry] : good air entry [No Respiratory Distress] : no respiratory distress  [Soft] : abdomen soft [Non Tender] : non-tender [No Masses/organomegaly] : no masses/organomegaly [Normal Bowel Sounds] : normal bowel sounds [Normal Gait] : normal gait [No Edema] : no edema [No Cyanosis] : no cyanosis [No Clubbing] : no clubbing [No Varicosities] : no varicosities [No Rash] : no rash [No Skin Lesions] : no skin lesions [No Focal Deficits] : no focal deficits [Moves all extremities] : moves all extremities [Normal Speech] : normal speech [Alert and Oriented] : alert and oriented [Normal memory] : normal memory [General Appearance - Well Developed] : well developed [Normal Appearance] : normal appearance [Well Groomed] : well groomed [General Appearance - Well Nourished] : well nourished [No Deformities] : no deformities [General Appearance - In No Acute Distress] : no acute distress [Normal Conjunctiva] : the conjunctiva exhibited no abnormalities [Eyelids - No Xanthelasma] : the eyelids demonstrated no xanthelasmas [No Oral Pallor] : no oral pallor [Normal Oral Mucosa] : normal oral mucosa [No Oral Cyanosis] : no oral cyanosis [Normal Jugular Venous A Waves Present] : normal jugular venous A waves present [Normal Jugular Venous V Waves Present] : normal jugular venous V waves present [No Jugular Venous Youngblood A Waves] : no jugular venous youngblood A waves [Respiration, Rhythm And Depth] : normal respiratory rhythm and effort [Exaggerated Use Of Accessory Muscles For Inspiration] : no accessory muscle use [Auscultation Breath Sounds / Voice Sounds] : lungs were clear to auscultation bilaterally [Abdomen Soft] : soft [Abdomen Tenderness] : non-tender [Abdomen Mass (___ Cm)] : no abdominal mass palpated [Abnormal Walk] : normal gait [Gait - Sufficient For Exercise Testing] : the gait was sufficient for exercise testing [Nail Clubbing] : no clubbing of the fingernails [Cyanosis, Localized] : no localized cyanosis [Petechial Hemorrhages (___cm)] : no petechial hemorrhages [Skin Color & Pigmentation] : normal skin color and pigmentation [] : no rash [No Venous Stasis] : no venous stasis [No Skin Ulcers] : no skin ulcer [Skin Lesions] : no skin lesions [No Xanthoma] : no  xanthoma was observed [Oriented To Time, Place, And Person] : oriented to person, place, and time [Affect] : the affect was normal [Mood] : the mood was normal [No Anxiety] : not feeling anxious [Normal Rate] : normal [Rhythm Regular] : regular [Normal S1] : normal S1 [Normal S2] : normal S2 [No Gallop] : no gallop heard [2+] : left 2+ [No Pitting Edema] : no pitting edema present [Left Carotid Bruit] : no bruit heard over the left carotid [S3] : no S3 [S4] : no S4 [Right Carotid Bruit] : no bruit heard over the right carotid [Right Femoral Bruit] : no bruit heard over the right femoral artery [Left Femoral Bruit] : no bruit heard over the left femoral artery [Bruit] : no bruit heard

## 2024-09-11 NOTE — DISCUSSION/SUMMARY
[FreeTextEntry1] : Mr. Vega has a history of coronary artery disease, dating back to 2008. He has a history of smoking with associated COPD. He presented in 2015 with exertional dyspnea.  He developed a myocardial infarction following the conclusion of exercise on the treadmill. In 2018 he presented with symptoms consistent with unstable angina, and is status post PCI to RCA.  He presents today in no acute distress.  He remains euvolemic on exam.  ECG illustrates normal sinus, with a left bundle branch block, unchanged from prior.  His blood pressure is better.  I reviewed his most recent blood work results, including his most recent lipid profile. I reviewed his echocardiogram from August 2022.  This revealed a normal ejection fraction with mild mitral regurgitation and age-appropriate valvular calcification.  I reviewed his nuclear stress test from August 2020, and his most recent coronary angiography.  I reviewed his echocardiogram from September 13, 2023.  This was a technically difficult study. Left ventricular systolic function was visually estimated at 30 to 35%, with multiple wall motion abnormalities.  Pharmacologic nuclear stress testing was performed September 29, 2023.  This revealed a large inferior and inferolateral defect consistent with infarct, an ejection fraction of 53%.  He will schedule a follow-up echocardiogram to reassess his ejection fraction.  I will be in contact with him to discuss the results.  He will have blood work done this morning.  If all is well, he will see me in 6 months. [EKG obtained to assist in diagnosis and management of assessed problem(s)] : EKG obtained to assist in diagnosis and management of assessed problem(s)

## 2024-09-11 NOTE — CARDIOLOGY SUMMARY
[___] : [unfilled] [de-identified] : January 26, 2023 sinus rhythm left bundle branch block July 26, 2023 sinus rhythm left bundle branch block January 18, 2024 normal sinus rhythm left bundle branch block March 5, 2024 normal sinus rhythm left bundle branch block September 11, 2024 normal sinus rhythm left bundle branch block [de-identified] : 2020\par  pharm\par  large fixed inf /inf lateral wall infarct\par  hypokinesis  [de-identified] : 2020\par  EF 50-55%\par  inf wall hypokinesis\par  mild diastolic dysfunction [de-identified] : 2008 , \par  2015 BMS \par  2018 [facility] RCA

## 2024-09-11 NOTE — HISTORY OF PRESENT ILLNESS
[FreeTextEntry1] : Anibal Vega presented to the office today for a cardiovascular evaluation. He was last seen in the office 6 months ago.  He is now 64 years old, with a history of coronary artery disease. In March of 2008, he developed unstable angina. He was transferred to [facility], where he was found to have a 90% stenosis in his proximal RCA, which was treated with a bare-metal stent. Nonobstructive disease was identified within his LAD at that time.  He had another stress test performed February 23, 2015. After concluding the test, he developed ST segment elevations, and was transferred emergently to [facility]. He was found to have an occluded circumflex with insignificant in-stent restenosis within the RCA stent. Moderate disease was found more distally in the RCA, as well as in the LAD.  Primary angioplasty was performed.  A bare-metal stent was implanted because of a history of bright red blood per rectum.  He has a history of smoking. He has a history of obstructive sleep apnea.  He has been diagnosed with COPD, and he has been taking inhalers intermittently.  Over time, multiple changes were made to his medications, and his blood pressure was better controlled overall.  He presented to the office in January, 2020 for having had severe chest and back discomfort in the context of severe emotional stress.  His wife had been ill, with pneumonia and a course of rehabilitation.  He was considering going to the hospital but did not because he was afraid to leave his wife alone.  He felt fine by the next morning.  We decided not to pursue additional testing at that time, noting a relatively recent set of examinations.  I asked him to return to the office today.  He presents to the office today having been feeling well from a cardiovascular perspective.  He reports no chest discomfort or shortness of breath with activity.  He denies orthopnea, PND and lower extremity edema.  He denies palpitations, dizziness and syncope.  He has tried to remain physically active.   His diet remains poor. He has been smoking 1/2-3/4 PPD.

## 2024-09-13 LAB
ALBUMIN SERPL ELPH-MCNC: 4 G/DL
ALP BLD-CCNC: 102 U/L
ALT SERPL-CCNC: 14 U/L
ANION GAP SERPL CALC-SCNC: 10 MMOL/L
AST SERPL-CCNC: 15 U/L
BILIRUB SERPL-MCNC: 0.4 MG/DL
BUN SERPL-MCNC: 12 MG/DL
CALCIUM SERPL-MCNC: 9.4 MG/DL
CHLORIDE SERPL-SCNC: 104 MMOL/L
CHOLEST SERPL-MCNC: 135 MG/DL
CO2 SERPL-SCNC: 26 MMOL/L
CREAT SERPL-MCNC: 1 MG/DL
EGFR: 84 ML/MIN/1.73M2
ESTIMATED AVERAGE GLUCOSE: 131 MG/DL
GLUCOSE SERPL-MCNC: 105 MG/DL
HBA1C MFR BLD HPLC: 6.2 %
HCT VFR BLD CALC: 47.4 %
HDLC SERPL-MCNC: 30 MG/DL
HGB BLD-MCNC: 15.4 G/DL
LDLC SERPL CALC-MCNC: 77 MG/DL
MCHC RBC-ENTMCNC: 29.8 PG
MCHC RBC-ENTMCNC: 32.5 GM/DL
MCV RBC AUTO: 91.7 FL
NONHDLC SERPL-MCNC: 106 MG/DL
PLATELET # BLD AUTO: 254 K/UL
POTASSIUM SERPL-SCNC: 4 MMOL/L
PROT SERPL-MCNC: 7.7 G/DL
RBC # BLD: 5.17 M/UL
RBC # FLD: 13.2 %
SODIUM SERPL-SCNC: 140 MMOL/L
TRIGL SERPL-MCNC: 163 MG/DL
TSH SERPL-ACNC: 2.55 UIU/ML
WBC # FLD AUTO: 9.11 K/UL

## 2024-09-16 ENCOUNTER — RX RENEWAL (OUTPATIENT)
Age: 64
End: 2024-09-16

## 2024-09-18 ENCOUNTER — APPOINTMENT (OUTPATIENT)
Dept: CARDIOLOGY | Facility: CLINIC | Age: 64
End: 2024-09-18
Payer: COMMERCIAL

## 2024-09-18 ENCOUNTER — MED ADMIN CHARGE (OUTPATIENT)
Age: 64
End: 2024-09-18

## 2024-09-18 PROCEDURE — 93306 TTE W/DOPPLER COMPLETE: CPT

## 2024-09-18 RX ORDER — PERFLUTREN 6.52 MG/ML
6.52 INJECTION, SUSPENSION INTRAVENOUS
Qty: 1 | Refills: 0 | Status: COMPLETED | OUTPATIENT
Start: 2024-09-18

## 2024-09-18 RX ADMIN — PERFLUTREN MG/ML: 6.52 INJECTION, SUSPENSION INTRAVENOUS at 00:00

## 2024-09-26 ENCOUNTER — APPOINTMENT (OUTPATIENT)
Dept: PULMONOLOGY | Facility: CLINIC | Age: 64
End: 2024-09-26
Payer: COMMERCIAL

## 2024-09-26 VITALS
OXYGEN SATURATION: 96 % | WEIGHT: 193 LBS | RESPIRATION RATE: 18 BRPM | BODY MASS INDEX: 28.58 KG/M2 | HEIGHT: 69 IN | SYSTOLIC BLOOD PRESSURE: 138 MMHG | DIASTOLIC BLOOD PRESSURE: 80 MMHG | HEART RATE: 63 BPM

## 2024-09-26 DIAGNOSIS — R93.89 ABNORMAL FINDINGS ON DIAGNOSTIC IMAGING OF OTHER SPECIFIED BODY STRUCTURES: ICD-10-CM

## 2024-09-26 DIAGNOSIS — R06.02 SHORTNESS OF BREATH: ICD-10-CM

## 2024-09-26 DIAGNOSIS — J44.1 CHRONIC OBSTRUCTIVE PULMONARY DISEASE WITH (ACUTE) EXACERBATION: ICD-10-CM

## 2024-09-26 DIAGNOSIS — K21.9 GASTRO-ESOPHAGEAL REFLUX DISEASE W/OUT ESOPHAGITIS: ICD-10-CM

## 2024-09-26 DIAGNOSIS — J84.9 INTERSTITIAL PULMONARY DISEASE, UNSPECIFIED: ICD-10-CM

## 2024-09-26 DIAGNOSIS — J43.9 EMPHYSEMA, UNSPECIFIED: ICD-10-CM

## 2024-09-26 DIAGNOSIS — G47.33 OBSTRUCTIVE SLEEP APNEA (ADULT) (PEDIATRIC): ICD-10-CM

## 2024-09-26 DIAGNOSIS — E66.9 OBESITY, UNSPECIFIED: ICD-10-CM

## 2024-09-26 DIAGNOSIS — F17.200 NICOTINE DEPENDENCE, UNSPECIFIED, UNCOMPLICATED: ICD-10-CM

## 2024-09-26 PROCEDURE — 94010 BREATHING CAPACITY TEST: CPT

## 2024-09-26 PROCEDURE — 99214 OFFICE O/P EST MOD 30 MIN: CPT | Mod: 25

## 2024-09-26 PROCEDURE — 99406 BEHAV CHNG SMOKING 3-10 MIN: CPT | Mod: 25

## 2024-09-26 PROCEDURE — 94727 GAS DIL/WSHOT DETER LNG VOL: CPT

## 2024-09-26 PROCEDURE — 94729 DIFFUSING CAPACITY: CPT

## 2024-09-26 PROCEDURE — 95012 NITRIC OXIDE EXP GAS DETER: CPT

## 2024-10-08 ENCOUNTER — TRANSCRIPTION ENCOUNTER (OUTPATIENT)
Age: 64
End: 2024-10-08

## 2024-12-14 ENCOUNTER — RX RENEWAL (OUTPATIENT)
Age: 64
End: 2024-12-14

## 2025-03-06 ENCOUNTER — TRANSCRIPTION ENCOUNTER (OUTPATIENT)
Age: 65
End: 2025-03-06

## 2025-03-12 ENCOUNTER — APPOINTMENT (OUTPATIENT)
Dept: CARDIOLOGY | Facility: CLINIC | Age: 65
End: 2025-03-12
Payer: COMMERCIAL

## 2025-03-12 ENCOUNTER — NON-APPOINTMENT (OUTPATIENT)
Age: 65
End: 2025-03-12

## 2025-03-12 VITALS
WEIGHT: 200 LBS | HEART RATE: 60 BPM | OXYGEN SATURATION: 96 % | BODY MASS INDEX: 29.62 KG/M2 | SYSTOLIC BLOOD PRESSURE: 132 MMHG | DIASTOLIC BLOOD PRESSURE: 77 MMHG | HEIGHT: 69 IN

## 2025-03-12 DIAGNOSIS — I25.10 ATHEROSCLEROTIC HEART DISEASE OF NATIVE CORONARY ARTERY W/OUT ANGINA PECTORIS: ICD-10-CM

## 2025-03-12 PROCEDURE — 93000 ELECTROCARDIOGRAM COMPLETE: CPT

## 2025-03-12 PROCEDURE — 99214 OFFICE O/P EST MOD 30 MIN: CPT | Mod: 25

## 2025-03-16 LAB
ALBUMIN SERPL ELPH-MCNC: 4.3 G/DL
ALP BLD-CCNC: 109 U/L
ALT SERPL-CCNC: 11 U/L
ANION GAP SERPL CALC-SCNC: 12 MMOL/L
AST SERPL-CCNC: 14 U/L
BILIRUB SERPL-MCNC: 0.4 MG/DL
BUN SERPL-MCNC: 13 MG/DL
CALCIUM SERPL-MCNC: 9.6 MG/DL
CHLORIDE SERPL-SCNC: 106 MMOL/L
CHOLEST SERPL-MCNC: 143 MG/DL
CO2 SERPL-SCNC: 26 MMOL/L
CREAT SERPL-MCNC: 0.9 MG/DL
EGFRCR SERPLBLD CKD-EPI 2021: 95 ML/MIN/1.73M2
ESTIMATED AVERAGE GLUCOSE: 131 MG/DL
GLUCOSE SERPL-MCNC: 96 MG/DL
HBA1C MFR BLD HPLC: 6.2 %
HCT VFR BLD CALC: 46.6 %
HDLC SERPL-MCNC: 39 MG/DL
HGB BLD-MCNC: 15.1 G/DL
LDLC SERPL CALC-MCNC: 81 MG/DL
MCHC RBC-ENTMCNC: 30.4 PG
MCHC RBC-ENTMCNC: 32.4 G/DL
MCV RBC AUTO: 94 FL
NONHDLC SERPL-MCNC: 103 MG/DL
PLATELET # BLD AUTO: 315 K/UL
POTASSIUM SERPL-SCNC: 4.7 MMOL/L
PROT SERPL-MCNC: 7.4 G/DL
RBC # BLD: 4.96 M/UL
RBC # FLD: 14.4 %
SODIUM SERPL-SCNC: 143 MMOL/L
TRIGL SERPL-MCNC: 127 MG/DL
TSH SERPL-ACNC: 2.84 UIU/ML
WBC # FLD AUTO: 13.9 K/UL

## 2025-03-21 ENCOUNTER — RX RENEWAL (OUTPATIENT)
Age: 65
End: 2025-03-21

## 2025-03-25 ENCOUNTER — APPOINTMENT (OUTPATIENT)
Dept: PULMONOLOGY | Facility: CLINIC | Age: 65
End: 2025-03-25
Payer: COMMERCIAL

## 2025-03-25 VITALS
WEIGHT: 202 LBS | TEMPERATURE: 96.6 F | RESPIRATION RATE: 18 BRPM | DIASTOLIC BLOOD PRESSURE: 64 MMHG | SYSTOLIC BLOOD PRESSURE: 132 MMHG | OXYGEN SATURATION: 95 % | BODY MASS INDEX: 29.92 KG/M2 | HEART RATE: 68 BPM | HEIGHT: 69 IN

## 2025-03-25 DIAGNOSIS — F17.200 NICOTINE DEPENDENCE, UNSPECIFIED, UNCOMPLICATED: ICD-10-CM

## 2025-03-25 DIAGNOSIS — J84.9 INTERSTITIAL PULMONARY DISEASE, UNSPECIFIED: ICD-10-CM

## 2025-03-25 DIAGNOSIS — R93.89 ABNORMAL FINDINGS ON DIAGNOSTIC IMAGING OF OTHER SPECIFIED BODY STRUCTURES: ICD-10-CM

## 2025-03-25 DIAGNOSIS — G47.33 OBSTRUCTIVE SLEEP APNEA (ADULT) (PEDIATRIC): ICD-10-CM

## 2025-03-25 DIAGNOSIS — K21.9 GASTRO-ESOPHAGEAL REFLUX DISEASE W/OUT ESOPHAGITIS: ICD-10-CM

## 2025-03-25 DIAGNOSIS — J43.9 EMPHYSEMA, UNSPECIFIED: ICD-10-CM

## 2025-03-25 DIAGNOSIS — R06.02 SHORTNESS OF BREATH: ICD-10-CM

## 2025-03-25 DIAGNOSIS — E66.9 OBESITY, UNSPECIFIED: ICD-10-CM

## 2025-03-25 PROCEDURE — 99406 BEHAV CHNG SMOKING 3-10 MIN: CPT | Mod: 25

## 2025-03-25 PROCEDURE — 94729 DIFFUSING CAPACITY: CPT

## 2025-03-25 PROCEDURE — 94727 GAS DIL/WSHOT DETER LNG VOL: CPT

## 2025-03-25 PROCEDURE — 99214 OFFICE O/P EST MOD 30 MIN: CPT | Mod: 25

## 2025-03-25 PROCEDURE — 95012 NITRIC OXIDE EXP GAS DETER: CPT

## 2025-03-25 PROCEDURE — 94010 BREATHING CAPACITY TEST: CPT

## 2025-04-28 ENCOUNTER — RX RENEWAL (OUTPATIENT)
Age: 65
End: 2025-04-28

## 2025-04-28 ENCOUNTER — LABORATORY RESULT (OUTPATIENT)
Age: 65
End: 2025-04-28

## 2025-05-08 ENCOUNTER — RX RENEWAL (OUTPATIENT)
Age: 65
End: 2025-05-08

## 2025-05-19 ENCOUNTER — RX RENEWAL (OUTPATIENT)
Age: 65
End: 2025-05-19

## 2025-05-20 ENCOUNTER — RX RENEWAL (OUTPATIENT)
Age: 65
End: 2025-05-20

## 2025-06-09 ENCOUNTER — OUTPATIENT (OUTPATIENT)
Dept: OUTPATIENT SERVICES | Facility: HOSPITAL | Age: 65
LOS: 1 days | End: 2025-06-09
Payer: COMMERCIAL

## 2025-06-09 ENCOUNTER — APPOINTMENT (OUTPATIENT)
Dept: CT IMAGING | Facility: IMAGING CENTER | Age: 65
End: 2025-06-09
Payer: COMMERCIAL

## 2025-06-09 DIAGNOSIS — Z00.8 ENCOUNTER FOR OTHER GENERAL EXAMINATION: ICD-10-CM

## 2025-06-09 DIAGNOSIS — Z95.5 PRESENCE OF CORONARY ANGIOPLASTY IMPLANT AND GRAFT: Chronic | ICD-10-CM

## 2025-06-09 DIAGNOSIS — R93.89 ABNORMAL FINDINGS ON DIAGNOSTIC IMAGING OF OTHER SPECIFIED BODY STRUCTURES: ICD-10-CM

## 2025-06-09 PROCEDURE — 71250 CT THORAX DX C-: CPT

## 2025-06-09 PROCEDURE — 71250 CT THORAX DX C-: CPT | Mod: 26

## 2025-06-20 ENCOUNTER — RX RENEWAL (OUTPATIENT)
Age: 65
End: 2025-06-20

## 2025-09-15 ENCOUNTER — APPOINTMENT (OUTPATIENT)
Dept: CARDIOLOGY | Facility: CLINIC | Age: 65
End: 2025-09-15
Payer: MEDICARE

## 2025-09-15 VITALS
OXYGEN SATURATION: 90 % | HEART RATE: 64 BPM | SYSTOLIC BLOOD PRESSURE: 133 MMHG | HEIGHT: 69 IN | WEIGHT: 201 LBS | DIASTOLIC BLOOD PRESSURE: 77 MMHG | BODY MASS INDEX: 29.77 KG/M2

## 2025-09-15 VITALS — DIASTOLIC BLOOD PRESSURE: 70 MMHG | SYSTOLIC BLOOD PRESSURE: 130 MMHG

## 2025-09-15 DIAGNOSIS — I25.10 ATHEROSCLEROTIC HEART DISEASE OF NATIVE CORONARY ARTERY W/OUT ANGINA PECTORIS: ICD-10-CM

## 2025-09-15 PROCEDURE — 99204 OFFICE O/P NEW MOD 45 MIN: CPT

## 2025-09-15 PROCEDURE — 93000 ELECTROCARDIOGRAM COMPLETE: CPT

## 2025-09-16 LAB
ALBUMIN SERPL ELPH-MCNC: 4.1 G/DL
ALP BLD-CCNC: 106 U/L
ALT SERPL-CCNC: 22 U/L
ANION GAP SERPL CALC-SCNC: 14 MMOL/L
AST SERPL-CCNC: 18 U/L
BILIRUB SERPL-MCNC: 0.4 MG/DL
BUN SERPL-MCNC: 12 MG/DL
CALCIUM SERPL-MCNC: 9.3 MG/DL
CHLORIDE SERPL-SCNC: 104 MMOL/L
CHOLEST SERPL-MCNC: 151 MG/DL
CO2 SERPL-SCNC: 23 MMOL/L
CREAT SERPL-MCNC: 0.94 MG/DL
EGFRCR SERPLBLD CKD-EPI 2021: 89 ML/MIN/1.73M2
ESTIMATED AVERAGE GLUCOSE: 140 MG/DL
GLUCOSE SERPL-MCNC: 115 MG/DL
HBA1C MFR BLD HPLC: 6.5 %
HCT VFR BLD CALC: 47.6 %
HDLC SERPL-MCNC: 30 MG/DL
HGB BLD-MCNC: 15.7 G/DL
LDLC SERPL-MCNC: 92 MG/DL
MCHC RBC-ENTMCNC: 30.5 PG
MCHC RBC-ENTMCNC: 33 G/DL
MCV RBC AUTO: 92.6 FL
NONHDLC SERPL-MCNC: 121 MG/DL
PLATELET # BLD AUTO: 311 K/UL
POTASSIUM SERPL-SCNC: 4.3 MMOL/L
PROT SERPL-MCNC: 7.5 G/DL
RBC # BLD: 5.14 M/UL
RBC # FLD: 13.4 %
SODIUM SERPL-SCNC: 142 MMOL/L
TRIGL SERPL-MCNC: 167 MG/DL
TSH SERPL-ACNC: 1.93 UIU/ML
WBC # FLD AUTO: 13.55 K/UL

## 2025-09-20 ENCOUNTER — RX RENEWAL (OUTPATIENT)
Age: 65
End: 2025-09-20